# Patient Record
Sex: FEMALE | Race: WHITE | URBAN - METROPOLITAN AREA
[De-identification: names, ages, dates, MRNs, and addresses within clinical notes are randomized per-mention and may not be internally consistent; named-entity substitution may affect disease eponyms.]

---

## 2020-07-15 RX ORDER — CLONAZEPAM 0.5 MG/1
TABLET, ORALLY DISINTEGRATING ORAL
COMMUNITY
End: 2020-08-31

## 2020-07-15 RX ORDER — OMEPRAZOLE 20 MG/1
20 CAPSULE, DELAYED RELEASE ORAL DAILY
COMMUNITY
End: 2020-08-31

## 2020-07-15 RX ORDER — ACYCLOVIR 400 MG/1
400 TABLET ORAL AS NEEDED
COMMUNITY

## 2020-08-05 LAB
ALBUMIN SERPL-MCNC: 4.6 G/DL (ref 3.9–5)
ALBUMIN/GLOB SERPL: 2.1 {RATIO} (ref 1.2–2.2)
ALP SERPL-CCNC: 51 IU/L (ref 39–117)
ALT SERPL-CCNC: 17 IU/L (ref 0–32)
AST SERPL-CCNC: 16 IU/L (ref 0–40)
BASOPHILS # BLD AUTO: 0 X10E3/UL (ref 0–0.2)
BASOPHILS NFR BLD AUTO: 1 %
BILIRUB SERPL-MCNC: 0.3 MG/DL (ref 0–1.2)
BUN SERPL-MCNC: 13 MG/DL (ref 6–20)
BUN/CREAT SERPL: 17 (ref 9–23)
CALCIUM SERPL-MCNC: 9.6 MG/DL (ref 8.7–10.2)
CHLORIDE SERPL-SCNC: 100 MMOL/L (ref 96–106)
CHOLEST SERPL-MCNC: 144 MG/DL (ref 100–199)
CO2 SERPL-SCNC: 22 MMOL/L (ref 20–29)
CREAT SERPL-MCNC: 0.76 MG/DL (ref 0.57–1)
EOSINOPHIL # BLD AUTO: 0.3 X10E3/UL (ref 0–0.4)
EOSINOPHIL NFR BLD AUTO: 4 %
ERYTHROCYTE [DISTWIDTH] IN BLOOD BY AUTOMATED COUNT: 12.3 % (ref 11.7–15.4)
GLOBULIN SER CALC-MCNC: 2.2 G/DL (ref 1.5–4.5)
GLUCOSE SERPL-MCNC: 97 MG/DL (ref 65–99)
HAV IGM SERPL QL IA: NEGATIVE
HBV CORE IGM SERPL QL IA: NEGATIVE
HBV SURFACE AG SERPL QL IA: NEGATIVE
HCT VFR BLD AUTO: 38.7 % (ref 34–46.6)
HCV AB S/CO SERPL IA: <0.1 S/CO RATIO (ref 0–0.9)
HDLC SERPL-MCNC: 60 MG/DL
HGB BLD-MCNC: 12.8 G/DL (ref 11.1–15.9)
IMM GRANULOCYTES # BLD AUTO: 0 X10E3/UL (ref 0–0.1)
IMM GRANULOCYTES NFR BLD AUTO: 0 %
LDLC SERPL CALC-MCNC: 69 MG/DL (ref 0–99)
LDLC/HDLC SERPL: 1.2 RATIO (ref 0–3.2)
LYMPHOCYTES # BLD AUTO: 2.1 X10E3/UL (ref 0.7–3.1)
LYMPHOCYTES NFR BLD AUTO: 36 %
MCH RBC QN AUTO: 30.9 PG (ref 26.6–33)
MCHC RBC AUTO-ENTMCNC: 33.1 G/DL (ref 31.5–35.7)
MCV RBC AUTO: 94 FL (ref 79–97)
MONOCYTES # BLD AUTO: 0.5 X10E3/UL (ref 0.1–0.9)
MONOCYTES NFR BLD AUTO: 8 %
NEUTROPHILS # BLD AUTO: 3 X10E3/UL (ref 1.4–7)
NEUTROPHILS NFR BLD AUTO: 51 %
PLATELET # BLD AUTO: 291 X10E3/UL (ref 150–450)
POTASSIUM SERPL-SCNC: 4.1 MMOL/L (ref 3.5–5.2)
PROT SERPL-MCNC: 6.8 G/DL (ref 6–8.5)
RBC # BLD AUTO: 4.14 X10E6/UL (ref 3.77–5.28)
SODIUM SERPL-SCNC: 138 MMOL/L (ref 134–144)
T4 SERPL-MCNC: NORMAL UG/DL
TRIGL SERPL-MCNC: 77 MG/DL (ref 0–149)
TSH SERPL DL<=0.005 MIU/L-ACNC: 4.56 UIU/ML (ref 0.45–4.5)
VLDLC SERPL CALC-MCNC: 15 MG/DL (ref 5–40)
WBC # BLD AUTO: 5.9 X10E3/UL (ref 3.4–10.8)

## 2020-08-06 ENCOUNTER — TELEPHONE (OUTPATIENT)
Dept: PRIMARY CARE CLINIC | Age: 30
End: 2020-08-06

## 2020-08-06 NOTE — TELEPHONE ENCOUNTER
Results scanned into system from [employer].  Kana Fairbanks states that the t4 wasn't able to collect because it wasn't enough specimen, so a new order would have to be done

## 2020-08-10 NOTE — TELEPHONE ENCOUNTER
Patient is asking for labs results; according to cate she keeps calling and she is going to come up here today

## 2020-08-11 NOTE — TELEPHONE ENCOUNTER
Normal  blood count, normal liver and kidney function and normal blood sugar. Normal cholesterols. Thyroid test normal ( borderline normal)  Suggest this needs to be rechecked in 3 months .     Hepatitis a, b, c negative

## 2020-08-13 NOTE — TELEPHONE ENCOUNTER
Patient called stating she had a few more questions about her lab results and she wants a call back.

## 2020-08-31 ENCOUNTER — OFFICE VISIT (OUTPATIENT)
Dept: PRIMARY CARE CLINIC | Age: 30
End: 2020-08-31
Payer: COMMERCIAL

## 2020-08-31 VITALS
WEIGHT: 155 LBS | RESPIRATION RATE: 18 BRPM | HEART RATE: 90 BPM | DIASTOLIC BLOOD PRESSURE: 68 MMHG | BODY MASS INDEX: 24.91 KG/M2 | HEIGHT: 66 IN | TEMPERATURE: 97.7 F | OXYGEN SATURATION: 99 % | SYSTOLIC BLOOD PRESSURE: 112 MMHG

## 2020-08-31 DIAGNOSIS — R94.6 ABNORMAL THYROID SCAN: Primary | Chronic | ICD-10-CM

## 2020-08-31 PROCEDURE — 99213 OFFICE O/P EST LOW 20 MIN: CPT | Performed by: FAMILY MEDICINE

## 2020-08-31 RX ORDER — SUMATRIPTAN 50 MG/1
50 TABLET, FILM COATED ORAL AS NEEDED
COMMUNITY
Start: 2020-07-16 | End: 2020-10-21

## 2020-08-31 RX ORDER — ALPRAZOLAM 1 MG/1
1 TABLET ORAL 3 TIMES DAILY
COMMUNITY
Start: 2020-08-08

## 2020-08-31 NOTE — PROGRESS NOTES
HISTORY OF PRESENT ILLNESS     Chronic episodes of dizziness and has been seeing speciality. Dizziness improved since psyche maide changes. Cardiology   Did carotid dopplers from Dr Ozzy Bundy revelaed that her thyroid  Had multiple cysts. The tsh was off us at last test but mildly so. .    Was told to see us for addl work up. Jenifer Aaron is a 27 y.o. female. Past Medical History:   Diagnosis Date    Anxiety     Anxiety     Headache     around periods     Social History     Tobacco Use    Smoking status: Never Smoker    Smokeless tobacco: Never Used   Substance Use Topics    Alcohol use: Not Currently     Frequency: Never    Drug use: Not on file       Family History   Problem Relation Age of Onset    Other Mother         Hep C    Lung Cancer Father        Review of Systems   Constitutional: Negative for chills, fever, malaise/fatigue and weight loss. HENT: Negative for sore throat. Respiratory: Negative for cough. Cardiovascular: Negative for chest pain, palpitations, orthopnea and leg swelling. Gastrointestinal: Negative for constipation, diarrhea and nausea. Musculoskeletal: Negative for myalgias and neck pain. Skin: Negative for itching and rash. Neurological: Positive for dizziness and headaches. Negative for tremors, focal weakness and loss of consciousness. Endo/Heme/Allergies: Does not bruise/bleed easily. Physical Exam  Vitals signs and nursing note reviewed. Constitutional:       General: She is not in acute distress. Appearance: Normal appearance. She is not ill-appearing. HENT:      Head: Normocephalic and atraumatic. Eyes:      General: No scleral icterus. Extraocular Movements: Extraocular movements intact. Conjunctiva/sclera: Conjunctivae normal.   Neck:      Musculoskeletal: No erythema, neck rigidity or muscular tenderness. Thyroid: Thyromegaly present. No thyroid mass or thyroid tenderness.       Trachea: Trachea and phonation normal.   Pulmonary:      Effort: Pulmonary effort is normal. No respiratory distress. Breath sounds: Normal breath sounds. No wheezing or rales. Abdominal:      Palpations: Abdomen is soft. Musculoskeletal:      Right lower leg: No edema. Left lower leg: No edema. Lymphadenopathy:      Cervical: No cervical adenopathy. Right cervical: No superficial, deep or posterior cervical adenopathy. Left cervical: No superficial, deep or posterior cervical adenopathy. Skin:     Capillary Refill: Capillary refill takes less than 2 seconds. Neurological:      General: No focal deficit present. Mental Status: She is alert and oriented to person, place, and time. Cranial Nerves: No cranial nerve deficit. Deep Tendon Reflexes: Reflexes normal.   Psychiatric:         Mood and Affect: Mood normal.         Behavior: Behavior normal.         Thought Content: Thought content normal.         Judgment: Judgment normal.           ASSESSMENT and PLAN  Diagnoses and all orders for this visit:    1. Abnormal thyroid scan  Comments:  get labs and  set up own appt to see endocrinology .     Orders:  -     T4 (THYROXINE)  -     THYROID PEROXIDASE (TPO) AB  -     T3 TOTAL  -     TSH 3RD GENERATION         Orders Placed This Encounter    T4 (THYROXINE)    THYROID PEROXIDASE (TPO) AB    T3 TOTAL    TSH 3RD GENERATION    ALPRAZolam (XANAX) 1 mg tablet    DISCONTD: SUMAtriptan (IMITREX) 50 mg tablet

## 2020-09-01 LAB
T3 SERPL-MCNC: 79 NG/DL (ref 71–180)
T4 SERPL-MCNC: 6.2 UG/DL (ref 4.5–12)
THYROPEROXIDASE AB SERPL-ACNC: 260 IU/ML (ref 0–34)
TSH SERPL DL<=0.005 MIU/L-ACNC: 2.62 UIU/ML (ref 0.45–4.5)

## 2020-09-08 ENCOUNTER — TELEPHONE (OUTPATIENT)
Dept: ENDOCRINOLOGY | Age: 30
End: 2020-09-08

## 2020-09-08 NOTE — TELEPHONE ENCOUNTER
----- Message from Roopa Grace sent at 9/2/2020  3:34 PM EDT -----  Regarding: Dr. Dona Nguyen General Message/Vendor Calls    Caller's first and last name:      Reason for call: Regarding scheduling NP thyroid referred by Dr. Na Viveros [phone]. Call back required yes/no and why: Yes       Best contact number(s): [phone]      Details to clarify the request: No apts available.       Roopa Grace

## 2020-09-23 ENCOUNTER — TELEPHONE (OUTPATIENT)
Dept: PRIMARY CARE CLINIC | Age: 30
End: 2020-09-23

## 2020-10-08 ENCOUNTER — TELEPHONE (OUTPATIENT)
Dept: PRIMARY CARE CLINIC | Age: 30
End: 2020-10-08

## 2020-10-14 NOTE — PROGRESS NOTES
Normal thyroid panel with elevated thyroid peroxidase antibodies. May have Hashimoto's thyroiditis. Shireen warner next week.    See pt case

## 2020-10-14 NOTE — TELEPHONE ENCOUNTER
Normal thyroid panel with elevated thyroid peroxidase antibodies. May have Hashimoto's thyroiditis. Jeanella Crea ee Dr Elmo Osler endo next week.

## 2020-10-21 ENCOUNTER — OFFICE VISIT (OUTPATIENT)
Dept: ENDOCRINOLOGY | Age: 30
End: 2020-10-21
Payer: COMMERCIAL

## 2020-10-21 VITALS
HEART RATE: 82 BPM | WEIGHT: 157 LBS | RESPIRATION RATE: 14 BRPM | HEIGHT: 66 IN | BODY MASS INDEX: 25.23 KG/M2 | OXYGEN SATURATION: 98 % | TEMPERATURE: 97.6 F | SYSTOLIC BLOOD PRESSURE: 123 MMHG | DIASTOLIC BLOOD PRESSURE: 81 MMHG

## 2020-10-21 DIAGNOSIS — E04.1 THYROID CYST: Primary | ICD-10-CM

## 2020-10-21 DIAGNOSIS — E06.3 HASHIMOTO'S THYROIDITIS: ICD-10-CM

## 2020-10-21 PROCEDURE — 76536 US EXAM OF HEAD AND NECK: CPT | Performed by: INTERNAL MEDICINE

## 2020-10-21 PROCEDURE — 99244 OFF/OP CNSLTJ NEW/EST MOD 40: CPT | Performed by: INTERNAL MEDICINE

## 2020-10-21 RX ORDER — BISMUTH SUBSALICYLATE 262 MG
1 TABLET,CHEWABLE ORAL DAILY
COMMUNITY

## 2020-10-21 NOTE — PROGRESS NOTES
Jero Dominguez MD          Patient Information  Date:10/21/2020  Name : Bo Link 27 y.o.     YOB: 1990         Referred by: Colten Brooks MD         Chief Complaint   Patient presents with    New Patient     referred for Thyroid       History of present illness    Bo Link is a 27 y.o. female  here for evaluation of thyroid cyst.  She was clinically found to have cardiac murmur, evaluated by Dr. Jasmyne Fuchs and had carotid ultrasound which showed thyroid cysts. Thyroid function tests: TSH was minimally off in July 2020, repeat TSH normal, TPO elevated. Mother might have thyroid issues. No family history of other autoimmune conditions    No dysphagia,dysphonia or dyspnea. Denies nervousness,shakiness,palpitations  No constipation or cold intolerance/heat intolerance  No history of known radiation exposure    No FH of thyroid disease. No FH of thyroid cancer     Past Medical History:   Diagnosis Date    Anxiety     Anxiety     Headache     around periods       Current Outpatient Medications   Medication Sig    SUMAtriptan (IMITREX) 50 mg tablet TAKE 1 TABLET BY MOUTH AT ONSET OF MIGRAINE, MAY REPEAT X 1 IN 2 HRS IF NEEDED. MAXIUMUM  MG IN 24 HRS    multivitamin (ONE A DAY) tablet Take 1 Tab by mouth daily.  vitamin C/biotin (HAIR-SKIN-NAILS, VIT C-BIOTIN, PO) Take  by mouth.  ALPRAZolam (XANAX) 1 mg tablet Take 1 mg by mouth three (3) times daily.  acyclovir (ZOVIRAX) 400 mg tablet Take 400 mg by mouth as needed. No current facility-administered medications for this visit.           Review of Systems:  - Constitutional Symptoms: no fevers, chills, weight loss  - Eyes: no blurry vision or double vision  - Cardiovascular: no chest pain or palpitations  - Respiratory: no cough or shortness of breath  - Gastrointestinal: no  abdominal pain  - Musculoskeletal: no joint pains or weakness  - Integumentary: no rashes  - Neurological: no numbness, tingling, or headaches  - Psychiatric: no depression or anxiety  - Endocrine: no heat or cold intolerance, no polyuria or polydipsia    Physical Examination:  Blood pressure 123/81, pulse 82, temperature 97.6 °F (36.4 °C), temperature source Oral, resp. rate 14, height 5' 6\" (1.676 m), weight 157 lb (71.2 kg), SpO2 98 %. - Body mass index is 25.34 kg/m². - General: pleasant, no distress, good eye contact  - HEENT: no exopthalmos, no periorbital edema, no scleral/conjunctival injection, EOMI, no lid lag or stare  - Neck: supple, no thyromegaly, nodules, lymph nodes,   - Cardiovascular: regular,  normal S1 and S2, no murmurs  - Respiratory: clear to auscultation bilaterally  - Gastrointestinal: soft, nontender, nondistended, BS +  - Musculoskeletal: no proximal muscle weakness in upper or lower extremities  - Integumentary: no tremors, no edema  - Neurological: alert and oriented   - Psychiatric: normal mood and affect  - Skin - normal turgor    Data Reviewed:       Lab Results   Component Value Date/Time    TSH 2.620 08/31/2020 11:24 AM    T4, Total 6.2 08/31/2020 11:24 AM        [] Reviewed labs    Assessment/Plan:     Hashimoto's thyroiditis  Thyroid cyst    Hashimoto's thyroiditis: Annual TSH  No need to follow antibodies  Discussed the association with other autoimmune conditions  Autoantibodies reflect autoimmunity and higher risk of hypothyroidism than general population, as long as TSH is normal there is no need for thyroid hormone replacement. Lifestyle changes discussed which she is already following      Thyroid ultrasound in the office revealed diffusely heterogenous gland with hypoechoic micronodules and surroundning echogenic rim  suggestive of autoimmune /Hashimoto's thyroiditis. Thank you for allowing me to participate in the care of this patient.     Lisa Abreu MD

## 2020-10-21 NOTE — PROGRESS NOTES
Lynsey Woods is a 27 y.o. female here for   Chief Complaint   Patient presents with    New Patient     referred for Thyroid       1. Have you been to the ER, urgent care clinic since your last visit? Hospitalized since your last visit? -n/a    2. Have you seen or consulted any other health care providers outside of the [address] [place] [address] since your last visit?   Include any pap smears or colon screening.-n/a

## 2020-10-21 NOTE — LETTER
10/25/20 Patient: Esperanza Hanley YOB: 1990 Date of Visit: 10/21/2020 Collette Plant, MD 
[facility] [id number] [place] [address] VIA In Basket Dear Collette Plant, MD, Thank you for referring Ms. Nette Benson to [address] [address] for evaluation. My notes for this consultation are attached. If you have questions, please do not hesitate to call me. I look forward to following your patient along with you. Sincerely, Damon Pak MD

## 2020-10-26 PROBLEM — R94.6 ABNORMAL THYROID SCAN: Status: ACTIVE | Noted: 2020-10-26

## 2020-11-04 ENCOUNTER — TELEPHONE (OUTPATIENT)
Dept: PRIMARY CARE CLINIC | Age: 30
End: 2020-11-04

## 2020-11-04 DIAGNOSIS — G43.009 MIGRAINE WITHOUT AURA, NOT INTRACTABLE, WITHOUT STATUS MIGRAINOSUS: ICD-10-CM

## 2020-11-04 RX ORDER — SUMATRIPTAN 50 MG/1
TABLET, FILM COATED ORAL
Qty: 9 TAB | Refills: 1 | Status: SHIPPED | OUTPATIENT
Start: 2020-11-04

## 2020-11-04 NOTE — TELEPHONE ENCOUNTER
Sent, but if she is using this medication every day she needs an appointment.  Don't think this medication is meant to be used every day

## 2021-09-09 RX ORDER — ACYCLOVIR 400 MG/1
400 TABLET ORAL AS NEEDED
OUTPATIENT
Start: 2021-09-09

## 2021-09-09 NOTE — TELEPHONE ENCOUNTER
This came in via paper; last prescribe by Dr. Gino Omer. Appear she has not seen you before. Does she need an appt?

## 2022-03-19 PROBLEM — R94.6 ABNORMAL THYROID SCAN: Status: ACTIVE | Noted: 2020-10-26

## 2023-05-22 RX ORDER — ACYCLOVIR 400 MG/1
400 TABLET ORAL PRN
COMMUNITY

## 2023-05-22 RX ORDER — ALPRAZOLAM 1 MG/1
1 TABLET ORAL 3 TIMES DAILY
COMMUNITY
Start: 2020-08-08

## 2023-05-22 RX ORDER — SUMATRIPTAN 50 MG/1
TABLET, FILM COATED ORAL
COMMUNITY
Start: 2020-11-04

## 2023-10-05 NOTE — PROGRESS NOTES
Thyroid Ultrasound Report    Patient Information  Date:10/25/2020  Name : Melyssa Pena 27 y.o.     YOB: 1990         Referred by: Jm Coffman MD , MD    Indication: Thyroid nodule    Multiple real time longitudinal and transverse images were obtained using a high  resolution ultrasound with a Linear transducer. Thyroid gland is diffusely heterogenous, right thyroid lobe measures 2.8 x 1.5 x 1.0 cm. Left lobe measures 3 x 1.5 x 1.2 cm. Isthmus measures 0.19 cm. There are multiple small hypoechoic micronodules consistent with Hashimoto's thyroiditis. No dominant cyst    Impression:  The thyroid gland is diffusely heterogenous in texture with hypoechoic micronodules and surroundning echogenic rim  suggestive of autoimmune /Hashimoto's thyroiditis.        Gómez Benson MD · Offered nicotine replacement therapy

## 2024-06-25 ENCOUNTER — HOSPITAL ENCOUNTER (OUTPATIENT)
Facility: HOSPITAL | Age: 34
Discharge: HOME | End: 2024-06-28
Payer: COMMERCIAL

## 2024-06-25 VITALS
OXYGEN SATURATION: 100 % | TEMPERATURE: 98.6 F | HEART RATE: 97 BPM | SYSTOLIC BLOOD PRESSURE: 114 MMHG | DIASTOLIC BLOOD PRESSURE: 79 MMHG

## 2024-06-25 PROCEDURE — 90853 GROUP PSYCHOTHERAPY: CPT

## 2024-06-25 ASSESSMENT — PATIENT HEALTH QUESTIONNAIRE - PHQ9
6. FEELING BAD ABOUT YOURSELF - OR THAT YOU ARE A FAILURE OR HAVE LET YOURSELF OR YOUR FAMILY DOWN: SEVERAL DAYS
5. POOR APPETITE OR OVEREATING: SEVERAL DAYS
9. THOUGHTS THAT YOU WOULD BE BETTER OFF DEAD, OR OF HURTING YOURSELF: NOT AT ALL
SUM OF ALL RESPONSES TO PHQ QUESTIONS 1-9: 10
7. TROUBLE CONCENTRATING ON THINGS, SUCH AS READING THE NEWSPAPER OR WATCHING TELEVISION: SEVERAL DAYS
4. FEELING TIRED OR HAVING LITTLE ENERGY: SEVERAL DAYS
SUM OF ALL RESPONSES TO PHQ QUESTIONS 1-9: 10
SUM OF ALL RESPONSES TO PHQ QUESTIONS 1-9: 10
8. MOVING OR SPEAKING SO SLOWLY THAT OTHER PEOPLE COULD HAVE NOTICED. OR THE OPPOSITE, BEING SO FIGETY OR RESTLESS THAT YOU HAVE BEEN MOVING AROUND A LOT MORE THAN USUAL: SEVERAL DAYS
SUM OF ALL RESPONSES TO PHQ9 QUESTIONS 1 & 2: 3
2. FEELING DOWN, DEPRESSED OR HOPELESS: SEVERAL DAYS
3. TROUBLE FALLING OR STAYING ASLEEP: MORE THAN HALF THE DAYS
SUM OF ALL RESPONSES TO PHQ QUESTIONS 1-9: 10
1. LITTLE INTEREST OR PLEASURE IN DOING THINGS: MORE THAN HALF THE DAYS

## 2024-06-25 ASSESSMENT — ANXIETY QUESTIONNAIRES
2. NOT BEING ABLE TO STOP OR CONTROL WORRYING: NEARLY EVERY DAY
3. WORRYING TOO MUCH ABOUT DIFFERENT THINGS: NOT AT ALL
1. FEELING NERVOUS, ANXIOUS, OR ON EDGE: NEARLY EVERY DAY
7. FEELING AFRAID AS IF SOMETHING AWFUL MIGHT HAPPEN: NEARLY EVERY DAY
GAD7 TOTAL SCORE: 15
6. BECOMING EASILY ANNOYED OR IRRITABLE: SEVERAL DAYS
5. BEING SO RESTLESS THAT IT IS HARD TO SIT STILL: MORE THAN HALF THE DAYS
IF YOU CHECKED OFF ANY PROBLEMS ON THIS QUESTIONNAIRE, HOW DIFFICULT HAVE THESE PROBLEMS MADE IT FOR YOU TO DO YOUR WORK, TAKE CARE OF THINGS AT HOME, OR GET ALONG WITH OTHER PEOPLE: VERY DIFFICULT
4. TROUBLE RELAXING: NEARLY EVERY DAY

## 2024-06-25 NOTE — BH NOTE
Goal: Intake Assessment for PHP      met with the patient for an intake assessment. PT denied SI/HI upon assessment. Pt did sign an GEORGI for her therapist and mother. SW did not complete COWS, Audit-C, or CIWA due to Pt denying current alcohol and opiate use.         
SSDI [] SSI  [] GADIS/ADC [] SS    [] Unemployment [] Handles own money [] Has payee (who)     service: [x] No [] Yes    Legal History: Current Charges: [x] No [] Yes   [] Police hold: [x] No [] Yes    On probation: [x] No [] Yes     [] long-term time: [x] No [] Yes (where/when)    TRAUMA AND ABUSE HISTORY  [] No [x] Yes  [] Emotional/mental [] Sexual []Physical  [] Childhood history   Describe (who, what, when):     Pt lost her father at age 11; pt reported father  of lung cancer, pt expressed that father passed with hospice - pt expressed that father passed 2 days after labor day (pt expressed that she did not realize the emotional impact as a child but feel that it has become more prevalent as she became an adult); pt expressed that she was raised by a single mother     Pt expressed that as a child she didn't think about it as much but as she became grown - asking for men or father figure for help can be difficult; Pt expressed she she began to mourn the relationship she would not have with her father during her 20's - 30's     Have you ever experienced being in a war zone or  combat: [x] No [] Yes  Have you ever experienced a bad accident, serious illness or natural disaster and thought you might be killed: [x] No [] Yes  Have you ever been attacked (or witnessed an attack) with intent to kill or injure:   [x] No [] Yes   Describe: N/A    Is there anything which triggers your re-experiencing this event: [x] No [] Yes   Describe: N/A    STRESSORS    Any recent losses: [] No [x] Yes     Pt expressed that she has been helping her mother manage feelings of uncomfortableness; pt expressed that she feels as though it had made her anxiety \"worse\" over time; worries about future and what they would look like as far as housing and future;     Pt expressed that she feels scared but optimisitc when thinking about future - pt expressed that she feels scared due to \"not knowing what I wanna be\", her career

## 2024-06-25 NOTE — GROUP NOTE
Group Therapy Note    Date: 6/25/2024    Group Start Time:  1:10 PM  Group End Time:  2:00 PM  Group Topic: Psychoeducation    Jorge Gamboa MSW        Group Therapy Note    Writer facilitated group and started with requesting patients to identify behaviors, emotions, thoughts, and physical signs related to anxiety. Patient's completed identification worksheet and were encouraged to reflect on why being self-aware of signs is the first step towards learning how to manage them. Patients were provided with a list of cognitive distortion to review and encouraged to discuss which ones impact their anxiety the most. To conclude, writer asked patient to write an anxiety provoked thought on post it note and put it in a jar. Writer opened each note and requested patients to identify distortion along with helping to reframe it.       Attendees: 5      Patient's Goal: to identify cognitive distortions, to manage and demonstrate anxiety management skills       Notes:  Pt arrived the last twenty minutes of group and followed along with the end of discussion. Pt shared struggling with catastrophizing the most with her thoughts.    Status After Intervention:  Unchanged    Participation Level: Active Listener    Participation Quality: Appropriate      Speech:  normal      Thought Process/Content: Logical      Affective Functioning: Congruent      Mood: anxious      Level of consciousness:  Alert and Oriented x4      Response to Learning: Progressing to goal      Endings: None Reported    Modes of Intervention: Education and Support      Discipline Responsible: /Counselor      Signature:  DANIEL Mccarty    
                                                                      Group Therapy Note    Date: 6/25/2024    Group Start Time:  1:10 PM  Group End Time:  2:00 PM  Group Topic: Psychoeducation    [facility] Kait Love MSW        Group Therapy Note  This writer facilitated a psychoeducational group focusing on patients to identify their signs of anxiety. Each patient was encouraged to identify triggers, risk factors, thoughts, behaviors, and emotions they experienced during the anxiety cycle. After this, this writer facilitated a discussion regarding the activity for thoughts, feelings, and reflections following the activity.       Attendees: 5       Patient's Goal:   To discuss anxiety and the anxiety cycle    Notes: PT was presented and participated in the group. Pt volunteered to provide insight on their answers and reflect on those answers. Pt identified the triggers and thoughts she experiences during intense anxiety. PT identified her trigger as health concerns. Pt identified the thoughts she experienced as, \"Why is this happening to me?\". Pt provided active listening and support to their peers as they shared their reflections based on their responses.     Status After Intervention:  Improved    Participation Level: Active Listener and Interactive    Participation Quality: Appropriate, Sharing, and Supportive      Speech:  normal      Thought Process/Content: Logical      Affective Functioning: Congruent      Mood: calm      Level of consciousness:  Alert and Oriented x4      Response to Learning: Able to verbalize current knowledge/experience, Able to verbalize/acknowledge new learning, Able to retain information, and Capable of insight      Endings: None Reported    Modes of Intervention: Education, Support, and Socialization      Discipline Responsible: /Counselor      Signature:  DANIEL Miller    
                                                                      Group Therapy Note    Date: 6/25/2024    Group Start Time:  2:00 PM  Group End Time:  2:45 PM  Group Topic: Wrap-Up    [facility]    Kait Crenshaw MSW        Group Therapy Note  This writer facilitated a wrap-up group by encouraging the patients to identify a song that brings out intense emotions, whether happy or sad. Each patient was asked to complete an outpatient behavioral health wrap-up sheet. After allowing time for this, this facilitator encouraged patients to discuss their thoughts, feelings, and reflections following the activity.      Attendees: 5       Patient's Goal: To express emotions    Notes: PT was present and participated in the PHP group. Pt volunteered to share their response and listened to the support given to them by their peers. 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 PT provided active listening for peers as they shared their responses to the question.     Status After Intervention:  Improved    Participation Level: Active Listener and Interactive    Participation Quality: Appropriate, Sharing, and Supportive      Speech:  normal      Thought Process/Content: Logical      Affective Functioning: Congruent      Mood: calm      Level of consciousness:  Alert and Oriented x4      Response to Learning: Able to verbalize current knowledge/experience, Able to verbalize/acknowledge new learning, Able to retain information, and Capable of insight      Endings: None Reported    Modes of Intervention: Support, Socialization, and Exploration      Discipline Responsible: /Counselor      Signature:  DANIEL Miller    
MSW

## 2024-06-25 NOTE — SUICIDE SAFETY PLAN
SAFETY PLAN    A suicide Safety Plan is a document that supports someone when they are having thoughts of suicide.    Warning Signs that indicate a suicidal crisis may be developing: What (situations, thoughts, feelings, body sensations, behaviors, etc.) do you experience that lets you know you are beginning to think about suicide?  1. Informed staff that she is having thoughts  2. N/A  3. N/A    Internal Coping Strategies:  What things can I do (relaxation techniques, hobbies, physical activities, etc.) to take my mind off my problems without contacting another person?  1. Walking  2. Listening to Music  3. Watch Television  4. Reading    People and social settings that provide distraction: Who can I call or where can I go to distract me?  1. Name: Miguel VYAS  Phone: [phone]  2. Name: Vandana ASHTON   Phone: [phone]   3. Place: [address]            4. Place: Walk in [facility]    People whom I can ask for help: Who can I call when I need help - for example, friends, family, clergy, someone else?  Name: Miguel VYAS  Phone: [phone]  2. Name: Vandana ASHTON   Phone: [phone]     Professionals or Behavioral Health agencies I can contact during a crisis: Who can I call for help - for example, my doctor, my psychiatrist, my psychologist, a mental health provider, a suicide hotline?  1. Clinician Name: Kait ([facility])   Phone: [phone]      Clinician Pager or Emergency Contact #: [phone]    2. Clinician Name: [facility] (Crisis Line)   Phone: [phone]      Clinician Pager or Emergency Contact #: [phone]    3. Suicide Prevention Lifeline: 3-527-904-TALK (4876)    4. Local Behavioral Health Emergency Services -  for example, [facility], [facility] suicide hotline, [facility] Hotline:   [address]      Emergency Services Address: [address] [place] [address] [address] [address]       Emergency Services Phone:  [phone]     Making the environment safe: How can I make my environment

## 2024-06-26 ENCOUNTER — HOSPITAL ENCOUNTER (OUTPATIENT)
Facility: HOSPITAL | Age: 34
Discharge: HOME | End: 2024-06-29
Payer: COMMERCIAL

## 2024-06-26 VITALS — DIASTOLIC BLOOD PRESSURE: 72 MMHG | OXYGEN SATURATION: 100 % | HEART RATE: 95 BPM | SYSTOLIC BLOOD PRESSURE: 102 MMHG

## 2024-06-26 PROCEDURE — 90853 GROUP PSYCHOTHERAPY: CPT

## 2024-06-26 NOTE — GROUP NOTE
Group Therapy Note    Date: 6/26/2024    Group Start Time: 10:40 AM  Group End Time: 11:30 AM  Group Topic: Cognitive Skills    [facility] Jorge Lr MSW        Group Therapy Note    Writer facilitated group on the cognitive model. Patients were provided with a worksheet reviewing the model and the different elements. Patients were encouraged to read and reflect on three practice exercises and then come up with one situation of their own. Patients were asked to write a situation, negative thought, and emotion/behavior on the board one at a time. Peers were encouraged to support pt in identifying rational thoughts and subsequent emotions/behaviors. Writer was unable to finish activity and stated it would be continued in the next group facilitated by writer.       Attendees: 6      Patient's Goal: to learn about CBT model, to identify and reframe irrational thoughts       Notes:  Pt processed and shared her example with peers. Patient expressed fearing to drive again after her first panic attack in 13 years this past month. Pt expressed fearing having the panic attack more so than driving. Patient was receptive to support and feedback offered by peers.     Status After Intervention:  Improved    Participation Level: Active Listener    Participation Quality: Appropriate      Speech:  normal      Thought Process/Content: Logical      Affective Functioning: Congruent      Mood: anxious      Level of consciousness:  Alert and Oriented x4      Response to Learning: Able to verbalize/acknowledge new learning, Able to retain information, Capable of insight, and Progressing to goal      Endings: None Reported    Modes of Intervention: Education and Support      Discipline Responsible: /Counselor      Signature:  DANIEL Mccarty

## 2024-06-26 NOTE — GROUP NOTE
Group Therapy Note    Date: 6/26/2024    Group Start Time: 12:00 PM  Group End Time:  1:00 PM  Group Topic: Psychotherapy    [facility]    Kait Crenshaw MSW        Group Therapy Note  This writer facilitated a psychotherapy group providing education on challenging intrusive thoughts. This facilitator used two visual aids via [employer] to provide a definition and potential strategies for managing intrusive thoughts. This facilitator provided each patient with an interactive handout worksheet that included a thought diary. Each patient was encouraged to fill out the thought diary in groups or wait for real-time usage. After allowing time for this, this facilitator encouraged patients to discuss their thoughts, feelings, and reflections following the activity    Attendees: 6       Patient's Goal: To challenge intrusive thoughts    Notes: PT was present and participated in the PHP group. Pt volunteered to share their response and listened to the support given to them by their peers. Pt identified one strategy from the worksheet that she would find helpful to manage intrusive thoughts. Pt identified this strategy as finding an activity she enjoys to distract her mind, such as walking or listening to music. PT provided active listening for peers as they shared their responses to the question.     Status After Intervention:  Improved    Participation Level: Active Listener and Interactive    Participation Quality: Appropriate, Sharing, and Supportive      Speech:  normal      Thought Process/Content: Logical      Affective Functioning: Congruent      Mood: calm      Level of consciousness:  Alert and Oriented x4      Response to Learning: Able to verbalize current knowledge/experience, Able to verbalize/acknowledge new learning, Able to retain information, Capable of insight, and Progressing to goal      Endings: None Reported    Modes of Intervention:

## 2024-06-26 NOTE — GROUP NOTE
Group Therapy Note    Date: 6/26/2024    Group Start Time:  9:00 AM  Group End Time:  9:40 AM  Group Topic: Community Meeting    [facility]    Kait Crenshaw MSW        Group Therapy Note  This writer facilitated a community group using the [employer] to encourage compassion, shift perspectives, and build connections. This facilitator prompted each person to answer their specific question aloud and encouraged their peers to respond to the questions as well. This facilitator provided each patient with an outpatient check-in sheet and a lunch menu to complete. After allowing time for this, the facilitator encouraged patients to discuss their thoughts, feelings, and reflections following the activity.      Attendees: 5       Patient's Goal: To encourage compassion, shift perspectives, and build connections     Notes:  PT was present and participated in the PHP group. Pt's question was, \"What thoughts are you having right now?\". Pt volunteered to share their response and listened to the support given to them by their peers. PT expressed that she is thinking about how her brother may be handling the doctor's appointments with her mother. PT provided active listening for peers as they shared their responses to the question.     Status After Intervention:  Improved    Participation Level: Active Listener and Interactive    Participation Quality: Appropriate, Sharing, and Supportive      Speech:  normal      Thought Process/Content: Logical      Affective Functioning: Congruent      Mood: calm      Level of consciousness:  Alert and Oriented x4      Response to Learning: Able to verbalize current knowledge/experience, Able to verbalize/acknowledge new learning, Able to retain information, and Capable of insight      Endings: None Reported    Modes of Intervention: Support, Socialization, and Exploration      Discipline Responsible: Social

## 2024-06-26 NOTE — BH NOTE
Patient Update    This writer met with the PT to review and collaborate to provide updates to her treatment plan goals. This writer a check in session to inquire about PT progress in groups and provide updates, if needed. The pt was receptive of the goals that were created and reviewed each goal along with this writer.

## 2024-06-26 NOTE — BH NOTE
PATIENT UPDATE    This writer reached out to the pt to inform her of her insurance denial. This writer informed the pt that [facility] staff has begun to process an appeal and reached out to her psychiatrist for additional documentation to support the claim. Pt was receptive of this information and inquired about potential cost of program if insurance does not approve. This writer indicated there may be a potential cost, however we are willing to work with her and support her however we can. PT was receptive of this information. PT inquired about having her psychiatrist refill prescriptions while attending [facility] program. This writer informed the pt that is okay, as long as she is not meeting with him. Pt was receptive of this information. This writer informed the pt she will keep her update as updates come about. PT was receptive.

## 2024-06-26 NOTE — BH NOTE
COLLATERAL CALL    This writer reached out to Dr. Hernández ([facility]) to provide insight about the pt's insurance denial. This writer inquired if Dr. Hernández would be able to write a letter for the appeal process and to determine the purpose of this treatment. Dr. Hernández was receptive to this information and indicated that  he would work on sending it over as soon as possible. This writer provided Dr. Hernández with the fax number for PHP for him to send it over.

## 2024-06-26 NOTE — GROUP NOTE
Group Therapy Note    Date: 6/26/2024    Group Start Time:  1:10 PM  Group End Time:  2:00 PM  Group Topic: Relapse Prevention    [facility] Jorge Lr MSW        Group Therapy Note    Writer shadowed peer specialist, Madi facilitating group on relapse prevention. 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 Madi encouraged peers to ask questions and share personal experiences. To conclude, Madi encouraged peers to share one high and one low from the day.        Attendees: 5        Patient's Goal: to learn about relapse prevention       Notes:  Pt actively listened to specialist the first twenty minutes and then stepped out due to feeling overwhelmed by conversation around suicide. Patient was encouraged to use skills to regulate and return to group. Pt stayed out the rest of group and will continue to be encouraged towards goal.    Status After Intervention:  Unchanged    Participation Level: Minimal    Participation Quality: Appropriate      Speech:  normal      Thought Process/Content: Logical      Affective Functioning: Congruent      Mood: anxious      Level of consciousness:  Alert      Response to Learning: Progressing to goal      Endings: None Reported    Modes of Intervention: Support, Socialization, and Exploration      Discipline Responsible: /Counselor      Signature:  DANIEL Mccarty

## 2024-06-26 NOTE — GROUP NOTE
Group Therapy Note    Date: 6/26/2024    Group Start Time:  2:00 PM  Group End Time:  2:45 PM  Group Topic: Wrap-Up    [facility] Jorge Lr MSW        Group Therapy Note    Writer facilitated group and started with requesting patients to complete wrap up sheets along with lunch menus. Patients were asked to write a key takeaway on the board and share it with peers. Writer facilitated a tapping meditation and encouraged reflections on somatic skills. To conclude, patients were asked to identify current emotion on feelings wheel and draw it on the back of the sheet.    Attendees: 6      Patient's Goal: to identify takeaway, to assess safety, to identify emotions            Notes:  Pt denied SI/HI on wrap up sheet and identified takeaway was realizing she could step away when triggered and return more regulated. Pt shared knowing that in the past but forgetting about it. Patient shared tapping helped and completed feelings activity.    Status After Intervention:  Improved    Participation Level: Active Listener    Participation Quality: Appropriate      Speech:  normal      Thought Process/Content: Logical      Affective Functioning: Congruent      Mood: anxious      Level of consciousness:  Alert and Oriented x4      Response to Learning: Progressing to goal      Endings: None Reported    Modes of Intervention: Support, Socialization, and Exploration      Discipline Responsible: /Counselor      Signature:  DANIEL Mccarty

## 2024-06-26 NOTE — GROUP NOTE
Group Therapy Note    Date: 6/26/2024    Group Start Time:  9:40 AM  Group End Time: 10:30 AM  Group Topic: Process Group - Outpatient    [facility]    Kait Crenshaw MSW        Group Therapy Note  This writer facilitated a process group by encouraging the patients to share any stressors that have recently been impacting them. This writer encouraged the patients to support one another and offer feedback to each other when appropriate. After allowing time for this, this writer used open-ended mental health process questions to prompt insight and socialization amongst the patients.       Attendees: 5       Patient's Goal:  To identify current stressors and provide support     Notes: PT was present and participated in the PHP group. When asked if she had anything she would like to discuss during the process group, she stated that she did not. PT provided active listening for peers as they shared their responses to the question. PT provided various interventions and support to her peers.     Status After Intervention:  Improved    Participation Level: Active Listener and Interactive    Participation Quality: Appropriate, Sharing, and Supportive      Speech:  normal      Thought Process/Content: Logical      Affective Functioning: Congruent      Mood: calm      Level of consciousness:  Alert and Oriented x4      Response to Learning: Able to verbalize current knowledge/experience, Able to verbalize/acknowledge new learning, Able to retain information, and Capable of insight      Endings: None Reported    Modes of Intervention: Support, Socialization, and Exploration      Discipline Responsible: /Counselor      Signature:  DANIEL Miller

## 2024-06-27 ENCOUNTER — HOSPITAL ENCOUNTER (OUTPATIENT)
Facility: HOSPITAL | Age: 34
Discharge: HOME | End: 2024-06-30
Payer: COMMERCIAL

## 2024-06-27 VITALS
HEART RATE: 86 BPM | DIASTOLIC BLOOD PRESSURE: 69 MMHG | OXYGEN SATURATION: 100 % | TEMPERATURE: 98.5 F | SYSTOLIC BLOOD PRESSURE: 104 MMHG

## 2024-06-27 PROCEDURE — 90853 GROUP PSYCHOTHERAPY: CPT

## 2024-06-27 NOTE — GROUP NOTE
Group Therapy Note    Date: 6/27/2024    Group Start Time: 10:40 AM  Group End Time: 11:30 AM  Group Topic: Cognitive Skills    [facility]    Kait Crenshaw MSW        Group Therapy Note  This writer facilitated a cognitive skills group focusing on identifying a happy space. This writer began a group discussing happiness, what it means to them, and how someone can express that they are happy. This writer encourages the patients to identify spaces, places, objects, and things that make them happy. Each patient was encouraged to create a visual of their happy place with the things listed, etc. After allowing time for this, the facilitator encouraged patients to discuss their thoughts, feelings, and reflections following the activity.    Attendees: 6       Patient's Goal:  To identify their happy place    Notes: PT was presented and participated in the group. Pt volunteered to provide insight on their answers and reflect on those answers. PT identified that her happy place is her [employer] home. PT expressed that she identifies this as her safe place due to feeling supported by the people around her. Pt provided active listening and support to their peers as they shared their reflections based on their responses.     Status After Intervention:  Improved    Participation Level: Active Listener and Interactive    Participation Quality: Appropriate, Sharing, and Supportive      Speech:  normal      Thought Process/Content: Logical      Affective Functioning: Congruent      Mood: calm      Level of consciousness:  Alert and Oriented x4      Response to Learning: Able to verbalize current knowledge/experience, Able to verbalize/acknowledge new learning, Able to retain information, Capable of insight, and Progressing to goal      Endings: None Reported    Modes of Intervention: Education, Support, and Socialization      Discipline Responsible: Social

## 2024-06-27 NOTE — GROUP NOTE
Group Therapy Note    Date: 6/27/2024    Group Start Time:  2:00 PM  Group End Time:  2:45 PM  Group Topic: Wrap-Up    [facility]    Kait Crenshaw MSW        Group Therapy Note  This writer facilitated a wrap-up group by encouraging the patients to identify a main takeaway of the day and participate in stress bingo. Each patient was encouraged to go to the board and write down their main takeaway of the day. Each patient was provided with a bingo board and markers to play the stress bingo game. Each patient was asked to complete an outpatient behavioral health wrap-up sheet and lunch menu.    Attendees: 6       Patient's Goal:  To identify main takeaway and participate in activity    Notes: PT was present and participated in the PHP group. Pt volunteered to share their response and listened to the support given to them by their peers. Pt identified that her main takeaway was realizing that she is more capable of what she thinks. PT provided active listening for peers as they shared their responses to the question.     Status After Intervention:  Improved    Participation Level: Active Listener and Interactive    Participation Quality: Appropriate, Sharing, and Supportive      Speech:  normal      Thought Process/Content: Logical      Affective Functioning: Congruent      Mood: calm      Level of consciousness:  Alert and Oriented x4      Response to Learning: Able to verbalize current knowledge/experience, Able to verbalize/acknowledge new learning, Able to retain information, Capable of insight, and Progressing to goal      Endings: None Reported    Modes of Intervention: Support, Socialization, and Exploration      Discipline Responsible: /Counselor      Signature:  DANIEL Miller

## 2024-06-27 NOTE — GROUP NOTE
Group Therapy Note    Date: 6/27/2024    Group Start Time:  9:00 AM  Group End Time:  9:40 AM  Group Topic: Community Meeting    [facility] Jorge Lr MSW        Group Therapy Note    Writer facilitated group and started with requesting patients to complete check in sheets to assess safety. Patients were asked to identify an intention of the day and jot it on the board to share with peers. Patients were requested to pull out CBT worksheet from yesterday and share their situations with peers to process and seek advice. The discussion continued into the next group.        Attendees: 6      Patient's Goal: to assess safety, to identify goal, to process emotions      Notes:  Pt denied SI/MEHREEN/HI on check in sheet and identified intention of the day was to learn more ways to cope with anxiety. Patient actively listened to peer process and encouraged him to validate his emotions before changing behavior.    Status After Intervention:  Improved    Participation Level: Active Listener    Participation Quality: Appropriate      Speech:  normal      Thought Process/Content: Logical      Affective Functioning: Congruent      Mood: anxious      Level of consciousness:  Alert and Oriented x4      Response to Learning: Capable of insight and Progressing to goal      Endings: None Reported    Modes of Intervention: Support, Socialization, and Exploration      Discipline Responsible: /Counselor      Signature:  DANIEL Mccarty

## 2024-06-27 NOTE — BH NOTE
COLLATERAL CALL    This writer reached out to Vandana Geronimo ([facility]) via telephone to make contact and confirm it was her. This writer informed Vandana that if she ever has any questions, would like updates, or has concerns, please reach out. Vandana was receptive to the information and thanked the writer for the call.

## 2024-06-27 NOTE — GROUP NOTE
Group Therapy Note    Date: 6/27/2024    Group Start Time:  1:10 PM  Group End Time:  2:00 PM  Group Topic: Psychoeducation    [facility] Kait Love MSW        Group Therapy Note  This writer facilitated a psychoeducational group focusing on patients identifying and reflecting on their happy places. Each patient was given the chance to talk about their happy place and the items they put in it. After this, this writer facilitated a discussion regarding the activity for thoughts, feelings, and reflections following the activity.    Attendees: 6       Patient's Goal:  To reflect on their happy place    Notes: PT was presented and participated in the group. Pt volunteered to provide insight on their answers and reflect on those answers. Pt provided insight to her peers that her happy place is a space where all of her favorite things are provided. Pt included a symbol of her cyndy because it means a lot to her and is something she uses for support. Pt provided active listening and support to their peers as they shared their reflections based on their responses.    Status After Intervention:  Improved    Participation Level: Active Listener and Interactive    Participation Quality: Appropriate, Sharing, and Supportive      Speech:  normal      Thought Process/Content: Logical      Affective Functioning: Congruent      Mood: calm      Level of consciousness:  Alert and Oriented x4      Response to Learning: Able to verbalize current knowledge/experience, Able to verbalize/acknowledge new learning, Able to retain information, Capable of insight, and Progressing to goal      Endings: None Reported    Modes of Intervention: Education, Support, and Socialization      Discipline Responsible: /Counselor      Signature:  DANIEL Miller

## 2024-06-27 NOTE — GROUP NOTE
Group Therapy Note    Date: 6/27/2024    Group Start Time:  9:40 AM  Group End Time: 10:30 AM  Group Topic: Process Group - Outpatient    [facility]    Jorge Clifton MSW        Group Therapy Note    Writer facilitated group and continued processing from community meeting. Each patient was asked to write on the board a situation, negative thought, and emotion/behavior they identified on CBT worksheet yesterday. Patients processed situation and with feedback from peers were able to reframe irrational thoughts.         Attendees: 6      Patient's Goal: to process emotions, to reframe cognitive distortions       Notes:  Patient actively listened to peers and was supportive towards them. Pt related to peer struggling with feeling judged and gaslit by people in her life who do not understand how anxiety impacts her. Patient encouraged peer to reframe harsh sentences to more compassionate ones.    Status After Intervention:  Improved    Participation Level: Active Listener    Participation Quality: Appropriate      Speech:  normal      Thought Process/Content: Logical      Affective Functioning: Congruent      Mood: anxious      Level of consciousness:  Alert and Oriented x4      Response to Learning: Capable of insight and Progressing to goal      Endings: None Reported    Modes of Intervention: Support, Socialization, and Exploration      Discipline Responsible: /Counselor      Signature:  DANIEL Mccarty

## 2024-06-27 NOTE — GROUP NOTE
Group Therapy Note    Date: 6/27/2024    Group Start Time: 12:00 PM  Group End Time:  1:00 PM  Group Topic: Psychotherapy    [facility] Jorge Lr MSW        Group Therapy Note    Writer facilitated group and started with a discussion on learned helplessness. Patients reflected on childhood experiences that led to a sense of hopelessness. Writer passed out worksheet on using compassion to address and understand symptoms of trauma. Patients were asked to read, reflect, and identify harsh statement to reframe with compassion. Patients completed exercise and shared it with the group. To conclude, this writer facilitated a progressive muscle relaxation meditation.       Attendees: 6      Patient's Goal: to process trauma, to identify coping skills, to reframe negative thoughts, to learn about compassion      Notes:  Pt reflected on not being able to process the death of her father or express her needs to manage social anxiety as a child. Pt shared that for the first time today she was able to eat lunch with her peers in the cafeteria without wanting to isolate. Patient followed along with content and was receptive to feedback. Pt was supportive towards peers and will continue to work towards goal.    Status After Intervention:  Improved    Participation Level: Active Listener    Participation Quality: Appropriate      Speech:  normal      Thought Process/Content: Logical      Affective Functioning: Congruent      Mood: anxious      Level of consciousness:  Alert and Oriented x4      Response to Learning: Able to retain information, Capable of insight, and Progressing to goal      Endings: None Reported    Modes of Intervention: Support, Socialization, and Exploration      Discipline Responsible: /Counselor      Signature:  DANIEL Mccarty

## 2024-06-28 ENCOUNTER — HOSPITAL ENCOUNTER (OUTPATIENT)
Facility: HOSPITAL | Age: 34
Discharge: HOME | End: 2024-07-01
Payer: COMMERCIAL

## 2024-06-28 VITALS — TEMPERATURE: 98.7 F | SYSTOLIC BLOOD PRESSURE: 120 MMHG | HEART RATE: 90 BPM | DIASTOLIC BLOOD PRESSURE: 71 MMHG

## 2024-06-28 PROCEDURE — 90853 GROUP PSYCHOTHERAPY: CPT

## 2024-06-28 NOTE — GROUP NOTE
Group Therapy Note    Date: 6/28/2024    Group Start Time:  9:00 AM  Group End Time:  9:40 AM  Group Topic: Community Meeting    [facility] Jorge Lr MSW        Group Therapy Note    Writer facilitated group and started with requesting patients to complete check in sheets and write an intention on the board to share with peers. Patients were provided with a brain dump worksheet and asked to complete it prior to process group. One patient started processing heart break and flashbacks along with hopelessness. Patients were encouraged to continue processing into the next group.      Attendees: 4      Patient's Goal: to assess safety, to process emotions        Notes:  Pt denied SI/HI/MEHREEN on check in sheet and identified intention of the day was to reduce anxiety to a manageable level so she can do regular activities.  Pt identified stressors as family issues and own feelings. Pt actively listened to peer process situation and provided words of encouragement.    Status After Intervention:  Improved    Participation Level: Active Listener    Participation Quality: Appropriate      Speech:  normal      Thought Process/Content: Logical      Affective Functioning: Congruent      Mood: anxious      Level of consciousness:  Alert and Oriented x4      Response to Learning: Capable of insight and Progressing to goal      Endings: None Reported    Modes of Intervention: Support, Socialization, and Exploration      Discipline Responsible: /Counselor      Signature:  DANIEL Mccarty

## 2024-06-28 NOTE — GROUP NOTE
Group Therapy Note    Date: 6/28/2024    Group Start Time:  2:00 PM  Group End Time:  2:45 PM  Group Topic: Wrap-Up    [facility]    Kait Crenshaw MSW        Group Therapy Note  This writer facilitated a wrap-up group by encouraging the patients to create a weekend schedule using a weekend planner handout. Each patient was provided with a handout and encouraged to identify activities, whether new or old, they would like to try over the weekend. Each patient was asked to complete an outpatient behavioral health wrap-up sheet. Each patient was provided with a copy of their safety plan. After allowing time for this, this facilitator encouraged patients to discuss their thoughts, feelings, and reflections following the activity.       Attendees: 4       Patient's Goal:  To create a weekend schedule     Notes: Pt was present and participated in the PHP group. Pt volunteered to share their response and listened to the support given to them by their peers. Pt expressed that she is looking forward to her weekend being fun, resting up, being present, and relaxing. Pt expressed that this will be accomplished through spending time with her mother and boyfriend and celebrating her mother's birthday. PT provided active listening for peers as they shared their responses to the question.     Status After Intervention:  Improved    Participation Level: Active Listener and Interactive    Participation Quality: Appropriate, Sharing, and Supportive      Speech:  normal      Thought Process/Content: Logical      Affective Functioning: Congruent      Mood: calm      Level of consciousness:  Alert and Oriented x4      Response to Learning: Able to verbalize current knowledge/experience, Able to verbalize/acknowledge new learning, Able to retain information, Capable of insight, and Progressing to goal      Endings: None Reported    Modes of Intervention: Support,

## 2024-06-28 NOTE — GROUP NOTE
Group Therapy Note    Date: 6/28/2024    Group Start Time:  1:10 PM  Group End Time:  2:00 PM  Group Topic: Psychotherapy    [facility] Jorge Lr MSW        Group Therapy Note    Writer facilitated group and started by allowing space for a patient who requested to process relationship with potential boyfriend. Patients processed and supported peer the first fifteen minutes of group. Writer transitioned into asking patients to share their bucket activities. To conclude, patients were asked to write affirmations for one another to take home for the weekend.      Attendees: 4        Patient's Goal: to identify coping skills, to improve well-being, to identify triggers       Notes:  Pt encouraged peer to think about her expectations from relationship and reminded pt of her strengths. Pt shared her bucket and identified cyndy being a huge coping skill and distress tolerance one. Patient will continue to work towards goal.    Status After Intervention:  Improved    Participation Level: Active Listener    Participation Quality: Appropriate      Speech:  normal      Thought Process/Content: Logical      Affective Functioning: Congruent      Mood: anxious      Level of consciousness:  Alert      Response to Learning: Able to verbalize/acknowledge new learning, Capable of insight, and Progressing to goal      Endings: None Reported    Modes of Intervention: Education, Support, and Activity      Discipline Responsible: /Counselor      Signature:  DANIEL Mccarty

## 2024-06-28 NOTE — GROUP NOTE
Group Therapy Note    Date: 6/28/2024    Group Start Time: 12:00 PM  Group End Time:  1:00 PM  Group Topic: Psychotherapy    [facility] Jorge Lr MSW        Group Therapy Note    Writer facilitated group and started with a discussion on self-esteem vs self-compassion. Patients were asked to actively listen to rosio talk on the research behind the two terms. Patients processed their own relationships with self-esteem. Patients were introduced to self-esteem bucket activity and asked to identify triggers, self care activities, and coping skills.       Attendees: 4        Patient's Goal: to improve self compassion, to improve social well being, to identify triggers, to identify coping skills      Notes:  Pt actively watched the video and reflected on how her idea of worth came from messages she heard as a child. Pt shared that her mom also did not know any other way so this pressure to be perfect is passed down. Patient was seen completing esteem bucket and agreeable to share it in the next group.    Status After Intervention:  Improved    Participation Level: Active Listener    Participation Quality: Appropriate      Speech:  normal      Thought Process/Content: Logical      Affective Functioning: Congruent      Mood: anxious      Level of consciousness:  Alert and Oriented x4      Response to Learning: Capable of insight and Progressing to goal      Endings: None Reported    Modes of Intervention: Education, Support, and Activity      Discipline Responsible: /Counselor      Signature:  DANIEL Mccarty

## 2024-06-28 NOTE — GROUP NOTE
Group Therapy Note    Date: 6/28/2024    Group Start Time:  9:40 AM  Group End Time: 10:30 AM  Group Topic: Process Group - Outpatient    [facility]    Kait Crenshaw MSW    Group Therapy Note  This writer facilitated a process group by encouraging the patients to share any stressors or triggers that have recently been impacting them. This writer encouraged the patients to support one another and offer feedback to each other when appropriate. After allowing time for this, this writer encourages patients to create their \"weather week,\" identifying bright points, struggles that occurred over the week, and potential things they are looking toward too. After allowing time for this, this facilitator encouraged patients to discuss their thoughts, feelings, and reflections following the activity.      Attendees: 4       Patient's Goal:  To identify current stressors and complete activity    Notes: PT was present and participated in the PHP group. PT processed her feelings of uncertainty as the weekend approached. Pt volunteered to share their response and listened to the support given to them by their peers. PT provided active listening for peers as they shared their responses to the question. Pt was receptive to the support and suggestions given to her by her peers. PT completed the weather week activity. Pt identified her bright spot as driving in her car alone for 20 minutes, her rainbow as learning more skills in PHP, and her cloudy moment as having her brother home and managing her mother's health concerns.     Status After Intervention:  Improved    Participation Level: Active Listener and Interactive    Participation Quality: Appropriate, Sharing, and Supportive      Speech:  normal      Thought Process/Content: Logical      Affective Functioning: Congruent      Mood: calm      Level of consciousness:  Alert and Oriented x4      Response to

## 2024-06-28 NOTE — GROUP NOTE
Group Therapy Note    Date: 6/28/2024    Group Start Time: 10:40 AM  Group End Time: 11:30 AM  Group Topic: Cognitive Skills    [facility]    Kait Crenshaw MSW        Group Therapy Note  This writer facilitated a cognitive skills group focusing on creating an emotional safety plan. Each patient was encouraged to identify warning signs, build their toolbox with coping skills, self-care strategies, and supportive options, and create an emotional safety plan based upon these answers. After allowing time for this, the facilitator encouraged patients to discuss their thoughts, feelings, and reflections following the activity.      Attendees: 4       Patient's Goal:  To create an emotional safety plan    Notes: PT was presented and participated in the group. PT completed her emotional safety plan. Pt volunteered to provide insight on their answers and reflect on those answers. Pt identified her warning signs as being around her brother, going home, and being alone in public. Pt expressed that once she becomes triggered, she experiences her heart pounding, feeling scared, thinking \"this will never end,\" and feeling stuck. Pt identified various coping skills, such as crying, praying, mediation, and stretching. Pt provided active listening and support to their peers as they shared their reflections based on their responses.     Status After Intervention:  Improved    Participation Level: Active Listener and Interactive    Participation Quality: Appropriate, Sharing, and Supportive      Speech:  normal      Thought Process/Content: Logical      Affective Functioning: Congruent      Mood: calm      Level of consciousness:  Alert and Oriented x4      Response to Learning: Able to verbalize current knowledge/experience, Able to verbalize/acknowledge new learning, Able to retain information, and Capable of insight      Endings: None Reported    Modes of

## 2024-07-01 ENCOUNTER — HOSPITAL ENCOUNTER (OUTPATIENT)
Facility: HOSPITAL | Age: 34
Setting detail: RECURRING SERIES
Discharge: HOME | End: 2024-07-04
Payer: COMMERCIAL

## 2024-07-01 VITALS
SYSTOLIC BLOOD PRESSURE: 111 MMHG | HEART RATE: 86 BPM | TEMPERATURE: 99.1 F | DIASTOLIC BLOOD PRESSURE: 73 MMHG | OXYGEN SATURATION: 99 %

## 2024-07-01 PROCEDURE — 90853 GROUP PSYCHOTHERAPY: CPT

## 2024-07-01 NOTE — GROUP NOTE
Group Therapy Note    Date: 7/1/2024    Group Start Time:  2:00 PM  Group End Time:  2:45 PM  Group Topic: Wrap-Up    [facility]    Kait Crenshaw MSW        Group Therapy Note  This writer facilitated a wrap-up group by encouraging the patients to identify a main takeaway of the day. Each patient was encouraged to go to the board and write down their main takeaway of the day. Each patient was asked to complete an outpatient behavioral health wrap-up sheet and lunch menu.    Attendees: 5       Patient's Goal:  To identify main takeaway    Notes: Pt was present and participated in the PHP group. Pt volunteered to share their response and listened to the support given to them by their peers. Pt identified that her main takeaway of the day was gaining insight. PT provided active listening for peers as they shared their responses to the question.    Status After Intervention:  Improved    Participation Level: Active Listener and Interactive    Participation Quality: Appropriate, Sharing, and Supportive      Speech:  normal      Thought Process/Content: Logical      Affective Functioning: Congruent      Mood: calm      Level of consciousness:  Alert and Oriented x4      Response to Learning: Able to verbalize current knowledge/experience, Able to verbalize/acknowledge new learning, Able to retain information, and Capable of insight      Endings: None Reported    Modes of Intervention: Support, Socialization, and Exploration      Discipline Responsible: /Counselor      Signature:  DANIEL Miller

## 2024-07-01 NOTE — GROUP NOTE
Group Therapy Note    Date: 7/1/2024    Group Start Time:  9:40 AM  Group End Time: 10:30 AM  Group Topic: Process Group - Outpatient    [facility]    Kait Crenshaw MSW        Group Therapy Note  This writer facilitated a process group by encouraging the patients to share any stressors or triggers that have recently been impacting them. This writer encouraged the patients to support one another and offer feedback to each other when appropriate. After allowing time for this, this writer encourages patients to reflect on positive experinces they've had using specific qualities. Each person recieved a handout with various qualities such as, courage, kindness, seflessness, love, and etc. Each person was encouraged to reflect and write about a time in which they've displayed each quality. After allowing time for this, this facilitator encouraged patients to discuss their thoughts, feelings, and reflections following the activity.    Attendees: 5       Patient's Goal:  To identify current stressors and provide support     Notes: PT was present and participated in the PHP group. Pt expressed that she did not have anything to process for the process group. PT completed the positive experience handout. Pt volunteered to share their response and listened to the support given to them by their peers. PT identified that she has displayed courage by being a  for elementary-aged children. PT provided active listening for peers as they shared their responses to the question.     Status After Intervention:  Improved    Participation Level: Active Listener and Interactive    Participation Quality: Appropriate, Sharing, and Supportive      Speech:  normal      Thought Process/Content: Logical      Affective Functioning: Congruent      Mood: calm      Level of consciousness:  Alert and Oriented x4      Response to Learning: Able to verbalize current

## 2024-07-01 NOTE — GROUP NOTE
Group Therapy Note    Date: 7/1/2024    Group Start Time:  9:00 AM  Group End Time:  9:40 AM  Group Topic: Community Meeting    [facility] Jorge Lr MSW        Group Therapy Note    Writer facilitated group and started with requesting patients to complete check in sheets along with menus. Patients were asked to identify an intention for the day and write it on the board. To conclude, patients were asked to complete ivon, bud, and thorn worksheets. Only two out of the five patients were able to share their answers.       Attendees: 5        Patient's Goal: to assess safety, to identify goal for the day          Notes:  Pt denied SI/HI/MEHREEN on check in sheet and stated goal of the day was to learn more coping skills. Patient completed worksheet and shared thorn was feeling hopeless on Saturday and fearing she will never get better. Pt was receptive to feedback and will continue to work towards goal.    Status After Intervention:  Improved    Participation Level: Active Listener    Participation Quality: Appropriate      Speech:  normal      Thought Process/Content: Logical      Affective Functioning: Congruent      Mood: anxious      Level of consciousness:  Alert and Oriented x4      Response to Learning: Progressing to goal      Endings: None Reported    Modes of Intervention: Support, Socialization, and Exploration      Discipline Responsible: /Counselor      Signature:  DANIEL Mccarty

## 2024-07-01 NOTE — GROUP NOTE
Group Therapy Note    Date: 7/1/2024    Group Start Time:  1:10 PM  Group End Time:  2:00 PM  Group Topic: Psychotherapy    [facility] Jorge Lr MSW        Group Therapy Note    Writer facilitated group and started with a discussion on inner and outer masks. Following discussion, writer projected various clips from little miss sunshine and asked patients to reflect on ways families can impact the formation and continued presentation of inner and outer masks.           Attendees: 4        Patient's Goal: to process emotions, to process family dynamics, to improve self-compassion       Notes:  Pt actively listened to clips and her peers. Patient expressed struggling with feeling insecure about some her past choices. Pt reflected on how damage to her inner mask allows for her to be compassionate towards others on the outside. Pt provided support to peers and will continue to work towards goal.    Status After Intervention:  Improved    Participation Level: Active Listener    Participation Quality: Appropriate      Speech:  normal      Thought Process/Content: Logical      Affective Functioning: Congruent      Mood: anxious      Level of consciousness:  Alert and Oriented x4      Response to Learning: Capable of insight and Progressing to goal      Endings: None Reported    Modes of Intervention: Support, Socialization, Exploration, and Activity      Discipline Responsible: /Counselor      Signature:  DANIEL Mccarty

## 2024-07-01 NOTE — GROUP NOTE
Group Therapy Note    Date: 7/1/2024    Group Start Time: 12:00 PM  Group End Time:  1:00 PM  Group Topic: Psychotherapy    [facility]    Kait Crenshaw MSW        Group Therapy Note  This writer facilitated a psychotherapy group providing education on dual diagnosis. This writer facilitated a group using a PowerPoint presentation, providing education on what dual diagnosis is, providing insight on common mental health disorders that it falls under, and suggesting potential interventions for future use. After allowing time for this, this facilitator encouraged patients to discuss their thoughts, feelings, and reflections following the activity.    Attendees: 4       Patient's Goal:  To provide education on dual diagnosis    Notes: PT was present and participated in the PHP group. Pt volunteered to share their response and listened to the support given to them by their peers. The patient expressed that she found the information on dual diagnosis informative and helpful. PT expressed that she did not realize how many Americans were also dealing with anxiety and panic attacks. PT expressed that she feels less alone in her mental health journey. PT provided active listening for peers as they shared their responses to the question.     Status After Intervention:  Improved    Participation Level: Active Listener and Interactive    Participation Quality: Appropriate, Sharing, and Supportive      Speech:  normal      Thought Process/Content: Logical      Affective Functioning: Congruent      Mood: calm      Level of consciousness:  Alert and Oriented x4      Response to Learning: Able to verbalize current knowledge/experience, Able to verbalize/acknowledge new learning, Able to retain information, Capable of insight, and Progressing to goal      Endings: None Reported    Modes of Intervention: Education, Support, and Exploration      Discipline

## 2024-07-01 NOTE — GROUP NOTE
Group Therapy Note    Date: 7/1/2024    Group Start Time: 10:40 AM  Group End Time: 11:30 AM  Group Topic: Psychotherapy    [facility] Jorge Lr MSW        Group Therapy Note    Writer facilitated group and started by projecting an analysis on the rise of guardians for \"Movie Clip Monday\" theme. Patients actively listened and reflected on themes such as identify vs. Role confusion along with intimacy vs. Isolation.       Attendees: 5          Patient's Goal: to learn about identity and role confusion, to process emotions     Notes:  Pt was attentive to the video and shared her experience with navigating different roles in her 20s vs 30s. Pt shared wanting to go into acting growing up but not finding roles that paid enough. Pt shared her anxiety also played a role in identity work. Patient was receptive to feedback and will continue to work towards goal.    Status After Intervention:  Improved    Participation Level: Active Listener    Participation Quality: Appropriate      Speech:  normal      Thought Process/Content: Logical      Affective Functioning: Congruent      Mood: anxious      Level of consciousness:  Alert and Oriented x4      Response to Learning: Capable of insight and Progressing to goal      Endings: None Reported    Modes of Intervention: Support, Exploration, and Activity      Discipline Responsible: /Counselor      Signature:  DANIEL Mccarty

## 2024-07-02 ENCOUNTER — HOSPITAL ENCOUNTER (OUTPATIENT)
Facility: HOSPITAL | Age: 34
Discharge: HOME | End: 2024-07-05
Payer: COMMERCIAL

## 2024-07-02 VITALS
TEMPERATURE: 98.1 F | OXYGEN SATURATION: 100 % | HEART RATE: 87 BPM | SYSTOLIC BLOOD PRESSURE: 111 MMHG | DIASTOLIC BLOOD PRESSURE: 79 MMHG

## 2024-07-02 NOTE — GROUP NOTE
Group Therapy Note    Date: 7/2/2024    Group Start Time: 10:40 AM  Group End Time: 11:30 AM  Group Topic: Cognitive Skills    Jorge Gamboa MSW        Group Therapy Note    Writer facilitated group and started with introducing the topic \" psychological flexibility.\" Patients were asked to complete a survey that provided a psychological flexibility score at the end. Patients were asked to assess their ability to stay present, commitment, values, self as context, defusion, and acceptance. Patients completed survey and reflected on answers out loud. Writer was unable to review skill sheet on flexibility and informed patients that it would be done in the next group by writer.       Attendees: 6        Patient's Goal: to learn about psychological flexibility, to assess commitment to values        Notes:  Pt completed worksheet and shared being able to stay present in groups but on the outside struggling with ruminating thoughts about both past and future. Patient actively listened to peers and provided them feedback. Pt will continue to work towards goal.    Status After Intervention:  Improved    Participation Level: Active Listener    Participation Quality: Appropriate      Speech:  normal      Thought Process/Content: Logical      Affective Functioning: Congruent      Mood: anxious      Level of consciousness:  Alert and Oriented x4      Response to Learning: Capable of insight and Progressing to goal      Endings: None Reported    Modes of Intervention: Education, Support, and Exploration      Discipline Responsible: /Counselor      Signature:  DANIEL Mccarty

## 2024-07-02 NOTE — GROUP NOTE
Group Therapy Note    Date: 7/2/2024    Group Start Time:  9:40 AM  Group End Time: 10:30 AM  Group Topic: Process Group - Outpatient    [facility]    Kait Crenshaw MSW        Group Therapy Note  This writer facilitated a process group by encouraging the patients to share any stressors that have recently been impacting them. This writer encouraged the patients to support one another and offer feedback to each other when appropriate. After allowing time for this, this writer used open-ended mental health process questions to prompt insight and socialization amongst the patients.     Attendees: 5       Patient's Goal: To identify current stressors and provide support     Notes: PT was present and participated in the PHP group. Pt identified that she did not have anything to cover in the process group. PT provided active listening for peers as they shared their responses to the question. Pt provided various suggestions and interventions to her peers as they shared their stressors and triggers.      Status After Intervention:  Improved    Participation Level: Active Listener and Interactive    Participation Quality: Appropriate, Sharing, and Supportive      Speech:  normal      Thought Process/Content: Logical      Affective Functioning: Congruent      Mood: calm      Level of consciousness:  Alert and Oriented x4      Response to Learning: Able to verbalize current knowledge/experience, Able to verbalize/acknowledge new learning, Able to retain information, and Capable of insight      Endings: None Reported    Modes of Intervention: Support, Socialization, and Exploration      Discipline Responsible: /Counselor      Signature:  DANIEL Miller

## 2024-07-02 NOTE — GROUP NOTE
Group Therapy Note    Date: 7/2/2024    Group Start Time: 12:00 PM  Group End Time:  1:00 PM  Group Topic: Psychotherapy    [facility]    Kait Crenshaw MSW        Group Therapy Note  This writer facilitated a psycho-educational group providing education on challenging intrusive thoughts. This facilitator provided each patient with an interactive handout worksheet that included a thought diary. Each patient was encouraged to fill out the thought diary in groups or wait for real-time usage. After allowing time for this, this facilitator encouraged patients to discuss their thoughts, feelings, and reflections following the activity.    Attendees: 6       Patient's Goal:  To challenge intrusive thoughts    Notes: PT was present and participated in the PHP group. Pt completed the challenging anxious thoughts handout. Pt volunteered to share their response and listened to the support given to them by their peers. PT provided active listening for peers as they shared their responses to the question. PT was able to identify thoughts she experiences during moments of high anxiety. PT identified these thoughts as \"I don't want to do this\" or \"these symptoms are scary.\".     Status After Intervention:  Improved    Participation Level: Active Listener and Interactive    Participation Quality: Appropriate, Sharing, and Supportive      Speech:  normal      Thought Process/Content: Logical      Affective Functioning: Congruent      Mood: calm      Level of consciousness:  Alert and Oriented x4      Response to Learning: Able to verbalize current knowledge/experience, Able to verbalize/acknowledge new learning, Able to retain information, Capable of insight, and Progressing to goal      Endings: None Reported    Modes of Intervention: Education, Support, and Socialization      Discipline Responsible: /Counselor      Signature:  Kait Crenshaw

## 2024-07-02 NOTE — GROUP NOTE
Group Therapy Note    Date: 7/2/2024    Group Start Time:  1:10 PM  Group End Time:  2:00 PM  Group Topic: Psychoeducation    Jorge Gamboa MSW        Group Therapy Note    Writer facilitated group and started with continuing conversation about psychological flexibility. Patients were asked to read the 6 different processes that assist with flexibility and identify actions (jot on the board) they can do to help. To conclude, patients were encouraged to identify one situation that anxiety is preventing them to do and identify one step from the processes they can do to help change the negative impact of anxiety.      Attendees: 5      Patient's Goal: to identify coping skills, to learn about diffusion, to identify triggers, to identify skills to help reframe distortions      Notes:  Pt was pulled the first thirty minutes by DANIEL Miller for an individual session. Patient returned and completed anxiety worksheet and identified anxiety preventing her from doing social outings with family. Pt shared she would be attending a baseball game tomorrow and will take it one step at a time to work towards change.    Status After Intervention:  Unchanged    Participation Level: Active Listener    Participation Quality: Appropriate      Speech:  normal      Thought Process/Content: Logical      Affective Functioning: Congruent      Mood: anxious      Level of consciousness:  Alert and Oriented x4      Response to Learning: Progressing to goal      Endings: None Reported    Modes of Intervention: Education and Support      Discipline Responsible: /Counselor      Signature:  DANIEL Mccarty

## 2024-07-02 NOTE — BH NOTE
Partial Hospitalization Program Individual Psychotherapy Note      Diagnosis: F41.9: Anxiety, unspecified    Goal:  This writer met with the PT to review and collaborate to provide updates to his treatment plan goals. This writer conducted an individual session to inquire about PT progress in groups and provide updates, if needed.         Psychotherapy Session    Start time: 1:15  Stop time: 1:45        Patient Mental Status and Mood/Affect:Calm, Congruent, and Happy    Patient Behavior and Appearance: Cooperative and Good eye contactshows no evidence of impairment    Intervention/Techniques: Informed, Validated/Supported, Reflected, Guided, Listened/Empathized, Observed/Monitored, Queired/Probed, Promoted Peer Support, and Facilitated Disclosure    Focus of Session/Patient Response and Progress Towards Goal: Pt was engaged in session as evidence of her ability to reflect on her symptoms and identify areas of need.     Narrative: This writer met with the patient to review and update his treatment plan. This writer discussed the current treatment goals and objectives with the patient and added in new progress made towards each goal. This writer began the session with a check-in to gain insight into the patient's current mood and how the patient has been adjusting to groups. The PT informed this writer that she has been enjoying groups and the content that has been covered so far. The pt informed this writer that she has been able to take the tools that she has learned in groups and use them as feelings of anxiety and triggers arise. The pt informed this writer that she has been talking about what she has learned in group to her family and has been reviewing the material after group as well. This writer provided verbal validation to the PT for being able to challenge herself and utilize the skills she is learning in group. This writer began inquiring about the PT's goals and the progress being made towards each goal.

## 2024-07-02 NOTE — GROUP NOTE
Group Therapy Note    Date: 7/2/2024    Group Start Time:  9:00 AM  Group End Time:  9:40 AM  Group Topic: Community Meeting    [facility]    Kait Crenshaw MSW        Group Therapy Note  This writer facilitated a community group focusing on identifying coping skills. Each patient was provided with a coping skills handout asking them to identify coping skills in alphabetical order. After allowing time for this, this writer asked each patient to identify a coping skill with the correlating letter and put it on the board. This facilitator provided each patient with an outpatient check-in sheet and a lunch menu to complete. After allowing time for this, the facilitator encouraged patients to discuss their thoughts, feelings, and reflections following the activity.    Attendees: 5       Patient's Goal: To identify coping skills    Notes:  Pt was present and participated in the PHP group. Pt completed coping skills handout. Pt volunteered to share their response and listened to the support given to them by their peers. Pt identified various coping skills that she uses or would like to try. Pt identified coping skills such as listening to music, walking around the park, and taking a nap. PT provided active listening for peers as they shared their responses to the question.     Status After Intervention:  Improved    Participation Level: Active Listener and Interactive    Participation Quality: Appropriate, Sharing, and Supportive      Speech:  normal      Thought Process/Content: Logical      Affective Functioning: Congruent      Mood: calm      Level of consciousness:  Alert and Oriented x4      Response to Learning: Able to verbalize current knowledge/experience, Able to verbalize/acknowledge new learning, Able to retain information, Capable of insight, and Progressing to goal      Endings: None Reported    Modes of Intervention: Support,

## 2024-07-02 NOTE — GROUP NOTE
Group Therapy Note    Date: 7/2/2024    Group Start Time:  2:00 PM  Group End Time:  2:45 PM  Group Topic: Wrap-Up    [facility]    Jorge Clifton MSW        Group Therapy Note    Writer facilitated group and started with requesting patients to complete wrap up and menu sheets. Patients were reminded that [facility] will be closed on July 4th. Patients shared what went well for them today with peers. To conclude, writer requested patients to identify one song that represents how they are feeling. Each song with the flory was projected on the screen and patients were encouraged to reflect on why they chose the song.      Attendees: 5        Patient's Goal: to process emotions, to assess safety, to identify takeaways      Notes:  Patient expressed feeling sad about one of her peers leaving. Patient shared that connecting with peers went well today. Patient denied SI/HI and participated in the music activity by playing the song \"little hands.\"    Status After Intervention:  Improved    Participation Level: Active Listener    Participation Quality: Appropriate      Speech:  normal      Thought Process/Content: Logical      Affective Functioning: Congruent      Mood: anxious      Level of consciousness:  Alert and Oriented x4      Response to Learning: Able to retain information, Capable of insight, and Progressing to goal      Endings: None Reported    Modes of Intervention: Support, Socialization, and Exploration      Discipline Responsible: /Counselor      Signature:  DANIEL Mccarty

## 2024-07-03 ENCOUNTER — HOSPITAL ENCOUNTER (OUTPATIENT)
Facility: HOSPITAL | Age: 34
Discharge: HOME | End: 2024-07-06
Payer: COMMERCIAL

## 2024-07-03 VITALS
SYSTOLIC BLOOD PRESSURE: 112 MMHG | OXYGEN SATURATION: 100 % | TEMPERATURE: 99.1 F | HEART RATE: 98 BPM | DIASTOLIC BLOOD PRESSURE: 74 MMHG

## 2024-07-03 PROCEDURE — 90853 GROUP PSYCHOTHERAPY: CPT

## 2024-07-03 NOTE — GROUP NOTE
Group Therapy Note    Date: 7/3/2024    Group Start Time: 10:40 AM  Group End Time: 11:30 AM  Group Topic: Cognitive Skills    [facility]    Kait Crenshaw MSW        Group Therapy Note  This writer facilitated a cognitive skills group focusing on identifying the difference between the inner critic and the inner . Each patient was encouraged to draw on their inner critic and , along with the thoughts they experienced from both. Each patient was provided an example picture to help with curating ideas or themes for their future. After allowing time for this, this writer encourages patients to reflect on their drawings.     Attendees: 7       Patient's Goal: To identify inner critic vs inner     Notes: PT was present and participated in the group. PT provided insight into what she thought an inner critic was. Pt was receptive to her peers as they shared their responses. Pt began working on her drawing to illustrate her inner critic vs. inner .     Status After Intervention:  Improved    Participation Level: Active Listener and Interactive    Participation Quality: Appropriate, Sharing, and Supportive      Speech:  normal      Thought Process/Content: Logical      Affective Functioning: Congruent      Mood: calm      Level of consciousness:  Alert and Oriented x4      Response to Learning: Able to verbalize current knowledge/experience, Able to verbalize/acknowledge new learning, Able to retain information, Capable of insight, and Progressing to goal      Endings: None Reported    Modes of Intervention: Education, Support, and Socialization      Discipline Responsible: /Counselor      Signature:  DANIEL Miller

## 2024-07-03 NOTE — GROUP NOTE
Group Therapy Note    Date: 7/3/2024    Group Start Time:  2:00 PM  Group End Time:  2:45 PM  Group Topic: Wrap-Up    [facility] Kait Love MSW        Group Therapy Note  This writer facilitated a wrap-up group focusing on identifying values. Each patient was provided with a value list and asked to rate them 1-10, with 1 being the most important and 10 being the least important. Each patient was provided with an outpatient check-out sheet. After allowing time for this, this writer encourages patients to reflect on their answers.     Attendees: 7       Patient's Goal: To identify values    Notes: Pt was present and participated in group. Pt identified her most important value as spirituality and her least important as popularity. Pt provided active listening to her peers as they shared their responses.    Status After Intervention:  Improved    Participation Level: Active Listener and Interactive    Participation Quality: Appropriate, Sharing, and Supportive      Speech:  normal      Thought Process/Content: Logical      Affective Functioning: Congruent      Mood: calm      Level of consciousness:  Alert and Oriented x4      Response to Learning: Able to verbalize current knowledge/experience, Able to verbalize/acknowledge new learning, Able to retain information, Capable of insight, and Progressing to goal      Endings: None Reported    Modes of Intervention: Support, Socialization, and Exploration      Discipline Responsible: /Counselor      Signature:  DANIEL Miller
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
## 2024-07-03 NOTE — GROUP NOTE
Group Therapy Note    Date: 7/3/2024    Group Start Time:  1:10 PM  Group End Time:  2:00 PM  Group Topic: Psychoeducation    [facility] Kait Love MSW        Group Therapy Note  This writer facilitated a psychoeducational group focusing on the inner critic and inner . This writer encouraged patients to present their drawings to their peers and explained the differences between their inner critic and inner . After allowing time for this, this writer encourages the patients to reflect on the activity.     Attendees: 6       Patient's Goal: To present inner critic vs inner  drawing    Notes: PT was present and participated in the group. Pt presented a drawing to her peers. Pt identified that she rudy a box to represent her inner critic due to its rigid thinking and emotions. Pt expressed that she rudy a Saxman to represent the soft side of her, which is her inner .     Status After Intervention:  Improved    Participation Level: Active Listener and Interactive    Participation Quality: Appropriate, Sharing, and Supportive      Speech:  normal      Thought Process/Content: Logical      Affective Functioning: Congruent      Mood: calm      Level of consciousness:  Alert and Oriented x4      Response to Learning: Able to verbalize current knowledge/experience, Able to verbalize/acknowledge new learning, Able to retain information, Capable of insight, and Progressing to goal      Endings: None Reported    Modes of Intervention: Education, Support, and Socialization      Discipline Responsible: /Counselor      Signature:  DANIEL Miller

## 2024-07-03 NOTE — GROUP NOTE
Group Therapy Note    Date: 7/3/2024    Group Start Time:  9:00 AM  Group End Time:  9:40 AM  Group Topic: Community Meeting    [facility] Jorge Lr MSW        Group Therapy Note    Writer facilitated group and started with requesting patients to complete check in sheet and identify an intention for the day. Patients were provided with a brain dump worksheet and asked to identify situation they may want to process into the next group. Patients completed worksheet and one of them got the chance to start processing.       Attendees: 6        Patient's Goals: to assess safety, to process emotions, to practice thought diffusion       Notes:  Pt denied SI/HI/MEHREEN on check in sheet and identified intention of the day was to process last night. Pt completed brain dump worksheet and started processing panic attack from reading the letter insurance sent denying approval for treatment. Patient appeared to become tearful and expressed feeling frustrated. Pt shared having a hard time doing ADLs and fearing that if she cannot continue program she might continue to spiral as she just started to get footing. Patient continued processing into the next group.    Status After Intervention:  Improved    Participation Level: Active Listener    Participation Quality: Appropriate      Speech:  normal      Thought Process/Content: Logical      Affective Functioning: Congruent      Mood: anxious      Level of consciousness:  Alert and Oriented x4      Response to Learning: Capable of insight and Progressing to goal      Endings: None Reported    Modes of Intervention: Support, Socialization, and Exploration      Discipline Responsible: /Counselor      Signature:  DANIEL Mccarty

## 2024-07-03 NOTE — GROUP NOTE
Group Therapy Note    Date: 7/3/2024    Group Start Time: 12:00 PM  Group End Time:  1:00 PM  Group Topic: Psychotherapy    [facility] Jorge Lr MSW        Group Therapy Note    Writer facilitated group and started with an emotion check in. Patients were asked to encouraged to play Kahoot and challenge each other on answering questions related to stress management, coping skills, and feeling identification. Patients answered questions and reflected on multiple strategies they have tried in the past.         Attendees: 7        Patient's Goal: to identify coping skills, to engage with peers, to process feelings      Notes:  Pt participated in activities and was supportive towards peers. Patient shared her experience the emotion calm being an uncomfortable emotion rather than comfortable. Pt will continue to be encouraged towards goal.    Status After Intervention:  Improved    Participation Level: Active Listener    Participation Quality: Appropriate      Speech:  normal      Thought Process/Content: Logical      Affective Functioning: Congruent      Mood: anxious      Level of consciousness:  Alert and Oriented x4      Response to Learning: Capable of insight and Progressing to goal      Endings: None Reported    Modes of Intervention: Education, Support, Socialization, and Exploration      Discipline Responsible: /Counselor      Signature:  DANIEL Mccarty

## 2024-07-05 ENCOUNTER — HOSPITAL ENCOUNTER (OUTPATIENT)
Facility: HOSPITAL | Age: 34
Discharge: HOME | End: 2024-07-08
Payer: COMMERCIAL

## 2024-07-05 VITALS
DIASTOLIC BLOOD PRESSURE: 71 MMHG | SYSTOLIC BLOOD PRESSURE: 123 MMHG | OXYGEN SATURATION: 100 % | TEMPERATURE: 98 F | HEART RATE: 85 BPM

## 2024-07-05 PROCEDURE — 90853 GROUP PSYCHOTHERAPY: CPT

## 2024-07-05 NOTE — GROUP NOTE
Group Therapy Note    Date: 7/5/2024    Group Start Time: 10:40 AM  Group End Time: 11:30 AM  Group Topic: Cognitive Skills    [facility] Jorge Lr MSW        Group Therapy Note    Writer facilitated group and started with an emotion check in. Patients were provided with instructions on how to create a coping skills card. Patients were given a list of coping skills to choose from if needed. Patients were provided with art supplies to create card. Patients completed cards and shared them with peers.      Attendees: 6        Patient's Goal: to identify coping skills, to practice coping skills    Notes:  Pt shared her personal coping card she took the baseball game which included phrases and skills. Patient was seen transferring into her new card and also asked peers for more grounding skills. Pt was receptive to feedback and will continue to work towards goal.    Status After Intervention:  Improved    Participation Level: Active Listener    Participation Quality: Appropriate      Speech:  normal      Thought Process/Content: Logical      Affective Functioning: Congruent      Mood: anxious      Level of consciousness:  Alert and Oriented x4      Response to Learning: Capable of insight and Progressing to goal      Endings: None Reported    Modes of Intervention: Education, Support, Socialization, and Exploration      Discipline Responsible: /Counselor      Signature:  DANIEL Mccarty

## 2024-07-05 NOTE — GROUP NOTE
Group Therapy Note    Date: 7/5/2024    Group Start Time:  2:00 PM  Group End Time:  2:45 PM  Group Topic: Wrap-Up    [facility]    Kait Crenshaw MSW        Group Therapy Note  This writer facilitated a wrap-up group by encouraging the patients to identify a main take away of the day. Each patient was asked to complete an outpatient behavioral health wrap-up sheet. Each patient was provided with a copy of their safety plan. After allowing time for this, this facilitator encouraged patients to discuss their thoughts, feelings, and reflections following the activity.     Attendees: 5       Patient's Goal:  To identify a main takeaway     Notes: Pt was present and participated in the PHP group. Pt volunteered to share their response and listened to the support given to them by their peers. Pt identified her main takeaways of the day are learning about coping skills, identifying triggers, and gaining insight. PT provided active listening for peers as they shared their responses to the question.    Status After Intervention:  Improved    Participation Level: Active Listener and Interactive    Participation Quality: Appropriate, Sharing, and Supportive      Speech:  normal      Thought Process/Content: Logical      Affective Functioning: Congruent      Mood: calm      Level of consciousness:  Alert and Oriented x4      Response to Learning: Able to verbalize current knowledge/experience, Able to verbalize/acknowledge new learning, Able to retain information, and Capable of insight      Endings: None Reported    Modes of Intervention: Support, Socialization, and Exploration      Discipline Responsible: /Counselor      Signature:  DANIEL Miller

## 2024-07-05 NOTE — GROUP NOTE
Group Therapy Note    Date: 7/5/2024    Group Start Time: 12:00 PM  Group End Time:  1:00 PM  Group Topic: Psychoeducation    [facility]    Kait Crenshaw MSW        Group Therapy Note  This writer facilitated a cognitive skills group focusing on educating people on the importance of mental health. Each participant was encouraged to watch a clip that shows how an individual can overcome mental health struggles and discuss mental health. After allowing time for this, the facilitator encouraged patients to discuss their thoughts, feelings, and reflections following the activity.    Attendees: 5       Patient's Goal: To discuss the importance of mental health    Notes: PT was presented and participated in the group. Pt volunteered to provide insight on their answers and reflect on those answers. Pt provided insight into the lack of resources and awareness about mental health programs in her local area. Pt expressed that she wished more people knew about PHP treatment and how beneficial it can be. Pt provided active listening and support to their peers as they shared their reflections based on their responses.     Status After Intervention:  Improved    Participation Level: Active Listener and Interactive    Participation Quality: Appropriate, Sharing, and Supportive      Speech:  normal      Thought Process/Content: Logical      Affective Functioning: Congruent      Mood: calm      Level of consciousness:  Alert and Oriented x4      Response to Learning: Able to verbalize current knowledge/experience, Able to verbalize/acknowledge new learning, Able to retain information, and Capable of insight      Endings: None Reported    Modes of Intervention: Education, Support, Socialization, and Exploration      Discipline Responsible: /Counselor      Signature:  DANIEL Miller

## 2024-07-05 NOTE — GROUP NOTE
Group Therapy Note    Date: 7/5/2024    Group Start Time:  1:10 PM  Group End Time:  2:00 PM  Group Topic: Psychotherapy    [facility] Jorge Lr MSW        Group Therapy Note    Writer facilitated group and started with encouraging patients to reflect on content started in previous group. Patients were showed more clips on how individuals struggling with mental illness can find meaning and purpose through the power of connection. Patients were asked to reflect on what group therapy means for them and the impact it has had so far on their own journeys.           Attendees: 5        Patient's Goal: to process emotions, to improve social well being       Notes:  Pt appeared tearful while watching clips and shared one of the things that makes her more compassionate at work is because she has struggled herself with mental health. Patient engaged in discussion with peers and was receptive to content.    Status After Intervention:  Improved    Participation Level: Active Listener    Participation Quality: Appropriate      Speech:  normal      Thought Process/Content: Logical      Affective Functioning: Congruent      Mood: anxious      Level of consciousness:  Alert      Response to Learning: Capable of insight and Progressing to goal      Endings: None Reported    Modes of Intervention: Education, Support, and Socialization      Discipline Responsible: /Counselor      Signature:  DANIEL Mccarty

## 2024-07-05 NOTE — GROUP NOTE
Group Therapy Note    Date: 7/5/2024    Group Start Time:  9:00 AM  Group End Time:  9:40 AM  Group Topic: Community Meeting    [facility] Jorge Lr MSW        Group Therapy Note    Writer facilitated group and started with requesting patients to complete wrap up sheets. Patients were asked to identify an intention for the day and right it on the board. Patients requested to process their holidays and peer discharging today. Patients were unable to finish processing and were encouraged to continue into the next group.      Attendees: 5        Patient's Goal: to assess safety, to identity an intention, to process emotions        Notes:  Pt denied SI/HI/MEHREEN on check in sheet and shared goal of the day was to continue learning coping skills. Patient started to cry when processing peer leaving and expressed fearing that she will not be able to apply skills when she is done with program. Pt was receptive to support and feedback.    Status After Intervention:  Improved    Participation Level: Active Listener    Participation Quality: Appropriate      Speech:  normal      Thought Process/Content: Logical      Affective Functioning: Congruent      Mood: anxious      Level of consciousness:  Alert and Oriented x4      Response to Learning: Capable of insight and Progressing to goal      Endings: None Reported    Modes of Intervention: Support, Socialization, and Exploration      Discipline Responsible: /Counselor      Signature:  DANIEL Mccarty

## 2024-07-05 NOTE — GROUP NOTE
Group Therapy Note    Date: 7/5/2024    Group Start Time:  9:40 AM  Group End Time: 10:30 AM  Group Topic: Process Group - Outpatient    [facility]    Kait Crenshaw MSW        Group Therapy Note  This writer facilitated a process group by encouraging the patients to share any stressors or triggers that have recently been impacting them. This writer encouraged the patients to support one another and offer feedback to each other when appropriate.  After allowing time for this, this facilitator encouraged patients to discuss their thoughts, feelings, and reflections following the activity.    Attendees: 6       Patient's Goal:  To identify current stressors and provide support     Notes: PT was present and participated in the PHP group. Pt volunteered to share their response and listened to the support given to them by their peers. Pt provided insight into her experiences while attending a baseball game on Wednesday night. PT provided insight into how she was able to regulate her emotions and anxiety despite feeling uncomfortable.     Status After Intervention:  Improved    Participation Level: Active Listener and Interactive    Participation Quality: Appropriate, Sharing, and Supportive      Speech:  normal      Thought Process/Content: Logical      Affective Functioning: Congruent      Mood: calm      Level of consciousness:  Alert and Oriented x4      Response to Learning: Able to verbalize current knowledge/experience, Able to verbalize/acknowledge new learning, Able to retain information, and Capable of insight      Endings: None Reported    Modes of Intervention: Support, Socialization, and Exploration      Discipline Responsible: /Counselor      Signature:  DANIEL Miller

## 2024-07-08 ENCOUNTER — HOSPITAL ENCOUNTER (OUTPATIENT)
Facility: HOSPITAL | Age: 34
Discharge: HOME | End: 2024-07-11
Payer: COMMERCIAL

## 2024-07-08 VITALS
SYSTOLIC BLOOD PRESSURE: 106 MMHG | OXYGEN SATURATION: 100 % | HEART RATE: 85 BPM | DIASTOLIC BLOOD PRESSURE: 71 MMHG | TEMPERATURE: 98.5 F

## 2024-07-08 PROCEDURE — 90853 GROUP PSYCHOTHERAPY: CPT

## 2024-07-08 NOTE — GROUP NOTE
Group Therapy Note    Date: 7/8/2024    Group Start Time:  9:00 AM  Group End Time:  9:40 AM  Group Topic: Community Meeting    [facility] Kait Love MSW        Group Therapy Note  This writer facilitated a community group focusing on reflection skills. Each patient was asked to provide the ivon, bud, and thorn of their weekend. This writer encouraged the patients to support one another and offer feedback to each other when appropriate. After allowing time for this, the facilitator encouraged patients to discuss their thoughts, feelings, and reflections following the activity.    Attendees: 5       Patient's Goal:  To reflect on events that occurred over the weekend    Notes: PT was present and participated in the group. Pt identified their ivon, thorn, and bud from their weekend. Pt provided insight that she spent time with her family over the weekend as her brother heads back to his home state. Pt indicated that she struggled with deciding whether she had the autonomy to say \"no\" to her family members.     Status After Intervention:  Improved    Participation Level: Active Listener and Interactive    Participation Quality: Appropriate, Sharing, and Supportive      Speech:  normal      Thought Process/Content: Logical      Affective Functioning: Congruent      Mood: calm      Level of consciousness:  Alert and Oriented x4      Response to Learning: Able to verbalize current knowledge/experience, Able to verbalize/acknowledge new learning, Able to retain information, and Capable of insight      Endings: None Reported    Modes of Intervention: Support, Socialization, and Exploration      Discipline Responsible: /Counselor      Signature:  DANIEL Miller

## 2024-07-08 NOTE — GROUP NOTE
Group Therapy Note    Date: 7/8/2024    Group Start Time:  1:10 PM  Group End Time:  2:00 PM  Group Topic: Psychoeducation    [facility] Kait Love MSW        Group Therapy Note  This writer facilitated a psychoeducation group focusing on challenging automatic thoughts. Each patient was provided with a handout to identify triggers, automatic thoughts, and how to reframe these thoughts. After allowing time for this, the facilitator encouraged patients to discuss their thoughts, feelings, and reflections following the activity.    Attendees: 3         Patient's Goal: To identify automatic thoughts and challenge them    Notes: PT was present and participated in the group. Pt identified automatic thoughts she experiences around her triggers. Pt identified one of her triggers as seeing individuals with large friend groups. Pt expressed that she begins to have automatic thoughts such as, \"I do not level up to the people around me.\" Pt was able to reframe her negative thoughts into positive ones.     Status After Intervention:  Improved    Participation Level: Active Listener and Interactive    Participation Quality: Appropriate, Sharing, and Supportive      Speech:  normal      Thought Process/Content: Logical      Affective Functioning: Congruent      Mood: calm      Level of consciousness:  Alert and Oriented x4      Response to Learning: Able to verbalize current knowledge/experience, Able to verbalize/acknowledge new learning, Able to retain information, Able to change behavior, and Progressing to goal      Endings: None Reported    Modes of Intervention: Education, Socialization, and Exploration      Discipline Responsible: /Counselor      Signature:  DANIEL Miller

## 2024-07-08 NOTE — GROUP NOTE
Group Therapy Note    Date: 7/8/2024    Group Start Time: 10:40 AM  Group End Time: 11:30 AM  Group Topic: Cognitive Skills    [facility] Kait Love MSW        Group Therapy Note  This writer facilitated a cognitive skills group foucsing on challenging the inner critic. Each patient was provided a handout with instructions to identify self critical triggers, thoughts, and emotions. Each patient were instructed to reframe their thoughts and feelings using their inner . After allowing time for this, the facilitator encouraged patients to discuss their thoughts, feelings, and reflections following the activity.      Attendees: 4       Patient's Goal: To challenge inner critic and use inner      Notes: PT was present and participated in the group. PT identified various triggers that bring about self-critical thoughts and feelings. Pt identified that she experiences emotions such as loneliness, sadness, and embarrassment during those instances.     Status After Intervention:  Improved    Participation Level: Active Listener and Interactive    Participation Quality: Appropriate, Sharing, and Supportive      Speech:  normal      Thought Process/Content: Logical      Affective Functioning: Congruent      Mood: calm      Level of consciousness:  Alert and Oriented x4      Response to Learning: Able to verbalize current knowledge/experience, Able to verbalize/acknowledge new learning, Capable of insight, Able to change behavior, and Progressing to goal      Endings: None Reported    Modes of Intervention: Education, Socialization, and Exploration      Discipline Responsible: /Counselor      Signature:  DANIEL Miller

## 2024-07-08 NOTE — GROUP NOTE
Group Therapy Note    Date: 7/8/2024    Group Start Time:  9:40 AM  Group End Time: 10:30 AM  Group Topic: Process Group - Outpatient    [facility]    Jorge Clifton MSW        Group Therapy Note    Writer facilitated process group and continued discussion from previous group. Patients supported and related to peers processing ex-relationships, medicaid transportation, and nightmares.          Attendees: 4        Patient's Goal: to process emotions, to improve social well being, to communicate needs       Notes:  Pt encouraged peer to put mementos from ex-relationship in a box and put it on a shelf away. Pt shared doing that for her past relationship and feeling comforted that it is still there but is not giving it any more energy. Pt shared green and red flags within relationships and will continue to be encouraged towards goal.    Status After Intervention:  Improved    Participation Level: Active Listener    Participation Quality: Appropriate      Speech:  normal      Thought Process/Content: Logical      Affective Functioning: Congruent      Mood: anxious      Level of consciousness:  Alert and Oriented x4      Response to Learning: Capable of insight and Progressing to goal      Endings: None Reported    Modes of Intervention: Support, Socialization, and Exploration      Discipline Responsible: /Counselor      Signature:  DANIEL Mccarty

## 2024-07-08 NOTE — GROUP NOTE
Group Therapy Note    Date: 7/8/2024    Group Start Time: 12:00 PM  Group End Time:  1:00 PM  Group Topic: Relapse Prevention    [facility] Jorge Lr MSW        Group Therapy Note    Writer facilitated group and introduced group topic. Three patients requested to process events from this past weekend that they did not get to vent about in previous groups. The rest of group was spent processing, relating, and sharing advice.         Attendees: 3          Patient's Goal: to process emotions, engage with peers    Notes:  Pt processed not being able to say no to her mom and set boundaries with her because of guilt. Patient also processed feeling frustrated at being caretaker but also not wanting to let mom alone. Patient was receptive to support and feedback.    Status After Intervention:  Improved    Participation Level: Active Listener    Participation Quality: Appropriate      Speech:  normal      Thought Process/Content: Logical      Affective Functioning: Congruent      Mood: anxious      Level of consciousness:  Alert and Oriented x4      Response to Learning: Capable of insight and Progressing to goal      Endings: None Reported    Modes of Intervention: Support, Socialization, and Exploration      Discipline Responsible: /Counselor      Signature:  DANIEL Mccarty

## 2024-07-09 ENCOUNTER — HOSPITAL ENCOUNTER (OUTPATIENT)
Facility: HOSPITAL | Age: 34
Discharge: HOME | End: 2024-07-12
Payer: COMMERCIAL

## 2024-07-09 VITALS
HEART RATE: 77 BPM | DIASTOLIC BLOOD PRESSURE: 75 MMHG | SYSTOLIC BLOOD PRESSURE: 119 MMHG | OXYGEN SATURATION: 100 % | TEMPERATURE: 98.8 F

## 2024-07-09 PROCEDURE — 90832 PSYTX W PT 30 MINUTES: CPT

## 2024-07-09 PROCEDURE — 90853 GROUP PSYCHOTHERAPY: CPT

## 2024-07-09 NOTE — GROUP NOTE
Group Therapy Note    Date: 7/9/2024    Group Start Time: 12:00 PM  Group End Time:  1:00 PM  Group Topic: Psychotherapy    [facility]    Kait Crenshaw MSW        Group Therapy Note  This writer facilitated a psychotherapy group providing education on forgiveness. Each patient was provided with a handout on what forgiveness is and is not provided via therapist aid. Each patient was provided a forgiveness packet in which they had to identify a situation, person, thoughts, behaviors, and feelings around the impactful event. After allowing time for this, this facilitator encouraged patients to discuss their thoughts, feelings, and reflections following the activity.      Attendees: 4       Patient's Goal:  To provide education on foregiveness    Notes: PT was present and participated in the PHP group. Pt volunteered to share their response and listened to the support given to them by their peers. Pt provided insight into her situation that occurred at [facility]. PT provided insight into how she felt neglected by the staff members and workers. Pt provided insight that this activity was hard to complete for her, but she was glad that she was able to get through it. PT provided active listening for peers as they shared their responses to the question.     Status After Intervention:  Improved    Participation Level: Active Listener and Interactive    Participation Quality: Appropriate, Sharing, and Supportive      Speech:  normal      Thought Process/Content: Logical      Affective Functioning: Congruent      Mood: calm      Level of consciousness:  Alert and Oriented x4      Response to Learning: Able to verbalize current knowledge/experience, Able to verbalize/acknowledge new learning, Able to retain information, Capable of insight, and Progressing to goal      Endings: None Reported    Modes of Intervention: Education, Support, and

## 2024-07-09 NOTE — GROUP NOTE
Group Therapy Note    Date: 7/9/2024    Group Start Time:  2:00 PM  Group End Time:  2:45 PM  Group Topic: Wrap-Up    [facility]    Kait Crenshaw MSW        Group Therapy Note  This writer facilitated a wrap-up group by encouraging patients to reflect on their drawings. Each patient was asked to describe how they were feeling in the moment. Each patient was asked to complete an outpatient behavioral health wrap-up sheet and lunch menu.    Attendees: 5       Patient's Goal:  To reflect on current emotions and feelings     Notes: PT was present and participated in the PHP group. Pt volunteered to share their response and listened to the support given to them by their peers. PT provided insight into her drawing and how she feels currently. Pt rudy a question katrin to symbolize how she feels during moments of hyperarousal, a dark line to represent her coming down from the hyperarousal, and the beach to represent her being at peace. PT provided active listening for peers as they shared their responses to the question.     Status After Intervention:  Improved    Participation Level: Active Listener and Interactive    Participation Quality: Appropriate, Sharing, and Supportive      Speech:  normal      Thought Process/Content: Logical      Affective Functioning: Congruent      Mood: calm      Level of consciousness:  Alert and Oriented x4      Response to Learning: Able to verbalize current knowledge/experience, Able to verbalize/acknowledge new learning, Able to retain information, Capable of insight, and Progressing to goal      Endings: None Reported    Modes of Intervention: Support, Socialization, and Exploration      Discipline Responsible: /Counselor      Signature:  DANIEL Miller

## 2024-07-09 NOTE — GROUP NOTE
Group Therapy Note    Date: 7/9/2024    Group Start Time: 10:40 AM  Group End Time: 11:30 AM  Group Topic: Cognitive Skills    [facility] Jorge Lr MSW        Group Therapy Note    Writer facilitated group and started with requesting patients to complete wellness assessment and reflect on observations. Patients were introduced to the DBT skill: PLEASE and provided a worksheet outlining the definitions along with examples for each factor. Patients were asked to reflect on their current habits and identify areas for improvement. Writer was unable to complete discussion due to time and informed patients that it will be continued in another group.        Attendees: 4    Patient's Goal: to improve wellbeing, to identify coping skills       Notes:  Patient completed assessment but was unable to share answers. Patient was pulled for an individual session with Kait Crenshaw for most of group.    Status After Intervention:  Unchanged    Participation Level: Active Listener    Participation Quality: Appropriate      Speech:  normal      Thought Process/Content: Logical      Affective Functioning: Congruent      Mood: anxious      Level of consciousness:  Alert      Response to Learning: Progressing to goal      Endings: None Reported    Modes of Intervention: Education and Support      Discipline Responsible: /Counselor      Signature:  DANIEL Mccarty

## 2024-07-09 NOTE — GROUP NOTE
Group Therapy Note    Date: 7/9/2024    Group Start Time:  1:10 PM  Group End Time:  2:00 PM  Group Topic: Psychotherapy    [facility]    Jorge Clifton MSW        Group Therapy Note    Writer facilitated group and requested patients to pull out sheet from group three to continue work on improving wellness habit. One patient requested to process last group due to it being heavy and other patients also agreed. Writer facilitated a shake out meditation before processing. Patients processed and related to one another about panic attacks and forgiveness. Writer also encouraged patients to come up with a list of grounding techniques to use when in distress. To conclude, writer encouraged patients to draw how they were feeling to help regulate.         Attendees: 5        Patient's Goal: to process emotions, to identify coping skills       Notes:  Patient appeared tearful and processed feeling overwhelmed and fearing potential panic attack. Patient processed feeling out of her body and being in an off mood since the morning. Patient was receptive to feedback and support.    Status After Intervention:  Improved    Participation Level: Active Listener    Participation Quality: Appropriate      Speech:  normal      Thought Process/Content: Logical      Affective Functioning: Congruent      Mood: anxious      Level of consciousness:  Alert and Oriented x4      Response to Learning: Capable of insight and Progressing to goal      Endings: None Reported    Modes of Intervention: Education and Support      Discipline Responsible: /Counselor      Signature:  DANIEL Mccarty

## 2024-07-09 NOTE — GROUP NOTE
Group Therapy Note    Date: 7/9/2024    Group Start Time:  9:00 AM  Group End Time:  9:40 AM  Group Topic: Community Meeting    [facility] Jorge Lr MSW        Group Therapy Note    Writer facilitated group and started with requesting patients to complete check in sheets to assess safety and mood. Patients were asked to identify an intention for the day and jot it on the board. To conclude, patients were asked to write a letter to themselves to open on last day of treatment. Patients completed letter and read them out loud.        Attendees: 5        Patient's Goal: to assess safety, to identify goals       Notes:  Patient appeared tearful when peers presented her with a birthday card. Patient shared not expecting to be her on her bday and having mixed feelings. Patient denied SI/HI/MEHREEN on check in sheet and stated intention of the day was to to reduce anxiety to a manageable level. Patient completed letter activity and wrote to herself.    Status After Intervention:  Improved    Participation Level: Active Listener    Participation Quality: Appropriate      Speech:  normal      Thought Process/Content: Logical      Affective Functioning: Congruent      Mood: anxious      Level of consciousness:  Alert and Oriented x4      Response to Learning: Capable of insight and Progressing to goal      Endings: None Reported    Modes of Intervention: Support, Socialization, and Exploration      Discipline Responsible: /Counselor      Signature:  DANIEL Mccarty

## 2024-07-09 NOTE — BH NOTE
Partial Hospitalization Program Individual Psychotherapy Note      Diagnosis: F41.9: Anxiety, unspecified     Goal: This writer met with the PT to review and collaborate to provide updates to her treatment plan goals. This writer conducted an individual session to inquire about PT progress in groups and provide updates, if needed.         Psychotherapy Session    Start time: 11:00  Stop time: 11:30        Patient Mental Status and Mood/Affect:Anxious, Calm, Congruent, and Happy    Patient Behavior and Appearance: Cooperative and Good eye contactshows no evidence of impairment    Intervention/Techniques: Informed, Validated/Supported, Reflected, Guided, Challenged, Reframed, Prompted/Cued, Listened/Empathized, Observed/Monitored, Queired/Probed, Clarified, Reinforced, Facilitated Disclosure, and Provided Feedback    Focus of Session/Patient Response and Progress Towards Goal: Pt was engaged in session as evidence of her ability to reflect on her symptoms and identify areas of need.     Narrative: This writer met with the patient to review and update his treatment plan. This writer discussed the current treatment goals and objectives with the patient and added in new progress made towards each goal. This writer began the session with a check-in to gain insight into the patient's current mood and mental status. The PT informed this writer that she was in a good mood due to today being her birthday. The PT informed this writer that she has been mentally stable and has not had any panic attacks recently. This writer inquired about the patient's perception of groups and whether she feels that her concerns are being addressed within group content. The PT informed this writer that she has been enjoying groups and the content so far. The PT informed this writer that she has been able to identify her triggers and learn new coping skills that she can use to manage her reactions to her triggers. This writer provided verbal

## 2024-07-09 NOTE — GROUP NOTE
Group Therapy Note    Date: 7/9/2024    Group Start Time:  9:40 AM  Group End Time: 10:30 AM  Group Topic: Process Group - Outpatient    [facility]    Kait Crenshaw MSW        Group Therapy Note  This writer facilitated a process group by encouraging the patients to share any stressors or triggers that have recently been impacting them. This writer encouraged the patients to support one another and offer feedback to each other when appropriate.  After allowing time for this, this facilitator encouraged patients to discuss their thoughts, feelings, and reflections following the activity.      Attendees: 5       Patient's Goal:  To identify current stressors and provide support     Notes:  Pt was present and participated in the PHP group. Pt volunteered to share their response and listened to the support given to them by their peers. Pt expressed that today is her birthday and provided insight about becoming a year older. PT provided active listening for peers as they shared their responses to the question.     Status After Intervention:  Improved    Participation Level: Active Listener and Interactive    Participation Quality: Appropriate, Sharing, and Supportive      Speech:  normal      Thought Process/Content: Logical      Affective Functioning: Congruent      Mood: calm      Level of consciousness:  Alert and Oriented x4      Response to Learning: Able to verbalize current knowledge/experience, Able to verbalize/acknowledge new learning, Able to retain information, and Capable of insight      Endings: None Reported    Modes of Intervention: Support, Socialization, and Exploration      Discipline Responsible: /Counselor      Signature:  DANIEL Miller

## 2024-07-10 ENCOUNTER — HOSPITAL ENCOUNTER (OUTPATIENT)
Facility: HOSPITAL | Age: 34
Discharge: HOME | End: 2024-07-13
Payer: COMMERCIAL

## 2024-07-10 VITALS
OXYGEN SATURATION: 100 % | HEART RATE: 79 BPM | TEMPERATURE: 98.4 F | DIASTOLIC BLOOD PRESSURE: 71 MMHG | SYSTOLIC BLOOD PRESSURE: 118 MMHG

## 2024-07-10 PROCEDURE — 90853 GROUP PSYCHOTHERAPY: CPT

## 2024-07-10 NOTE — GROUP NOTE
Group Therapy Note    Date: 7/10/2024    Group Start Time: 10:40 AM  Group End Time: 11:30 AM  Group Topic: Cognitive Skills    [facility] Sin Ledbetter LCSW        Group Therapy Note    This writer facilitated the cognitive skills group. The patients were encouraged to participate in an ice breaker question to get to know this facilitator and for this facilitator to get to know the patients. This writer facilitated a conversation on forgiveness and issues with a past worksheet on forgiveness. The patient ended group with a \"shake it out exercise and another ice breaker\"    Attendees: 5       Patient's Goal:  Participate in discussion on forgiveness       Notes: The patient participated in the ice breaker exercise. The patient participated in the discussion on forgiveness. The patient shared her experience trying to understand those she has forgiven and that being helpful.  The patient The patient will continue to participate in discussions during the cognitive skill group.      Status After Intervention:  Unchanged    Participation Level: Active Listener and Interactive    Participation Quality: Appropriate, Attentive, Sharing, and Supportive      Speech:  normal      Thought Process/Content: Logical      Affective Functioning: Congruent      Mood: euthymic      Level of consciousness:  Alert, Oriented x4, and Attentive      Response to Learning: Able to verbalize current knowledge/experience, Capable of insight, and Progressing to goal      Endings: None Reported    Modes of Intervention: Support, Socialization, and Exploration      Discipline Responsible: /Counselor      Signature:  Sin Jacobs LCSW

## 2024-07-10 NOTE — GROUP NOTE
Group Therapy Note    Date: 7/10/2024    Group Start Time:  2:00 PM  Group End Time:  2:45 PM  Group Topic: Wrap-Up    [facility]    Sin Jacobs LCSW        Group Therapy Note    This writer facilitated the wrap up group. The patients were encouraged to complete the afternoon assessment. The patients were encouraged to complete an emotion identification exercise to discuss where in their bodies they feel emotions.     Attendees: 6       Patient's Goal: Identify mood and reflect on emotions.        Notes: The patient completed the afternoon assessment and denied SI/HI. The patient completed the emotion identification exercise. The patient requested to speak with a  to pray. The patient will continue to identify emotions and reflect on where emotions are felt in their bodies.      Status After Intervention:  Unchanged    Participation Level: Active Listener and Interactive    Participation Quality: Appropriate, Attentive, Sharing, and Supportive      Speech:  normal      Thought Process/Content: Logical      Affective Functioning: Congruent      Mood: euthymic      Level of consciousness:  Alert, Oriented x4, and Attentive      Response to Learning: Able to verbalize current knowledge/experience, Capable of insight, and Progressing to goal      Endings: None Reported    Modes of Intervention: Support, Socialization, Exploration, and Activity      Discipline Responsible: /Counselor      Signature:  Sin Jacobs LCSW

## 2024-07-10 NOTE — GROUP NOTE
Group Therapy Note    Date: 7/10/2024    Group Start Time:  1:10 PM  Group End Time:  2:00 PM  Group Topic: Process Group - Outpatient    [facility]    Sin Jacobs LCSW        Group Therapy Note    This writer observed while group was facilitated by Spring BLANCAS    Attendees: 6       Patient's Goal: be attentive to  presentation        Notes: The patient was attentive to  presentation. The patient participated in a discussion on building support. The patient discussed creating a support system with the . The patient will continue to be attentive to  presentation.      Status After Intervention:  Unchanged    Participation Level: Active Listener and Interactive    Participation Quality: Appropriate      Speech:  normal      Thought Process/Content: Logical      Affective Functioning: Congruent      Mood: euthymic      Level of consciousness:  Alert, Oriented x4, and Attentive      Response to Learning: Able to verbalize current knowledge/experience, Capable of insight, and Progressing to goal      Endings: None Reported    Modes of Intervention: Education      Discipline Responsible: /Counselor      Signature:  Sin Jacobs LCSW

## 2024-07-10 NOTE — GROUP NOTE
Group Therapy Note    Date: 7/10/2024    Group Start Time:  9:40 AM  Group End Time: 10:30 AM  Group Topic: Process Group - Outpatient    [facility]    Jorge Clifton MSW        Group Therapy Note    Writer facilitated group and started with encouraging patients to process a situation that they may needed feedback on or just need to vent.  Patients requested to sit outside to process. Patients processed peer's struggle with agoraphobia and safety zones as well as the impact finances can have on relationships.       Attendees: 5        Patient's Goal: to process emotions, to engage with peers       Notes:  Patient provided feedback to peer struggling with fear around relationships. Patient shared her and bf both are not materialistic but they still work for living cost. Patient reflected on both things being true at the same time.    Status After Intervention:  Improved    Participation Level: Active Listener    Participation Quality: Appropriate      Speech:  normal      Thought Process/Content: Logical      Affective Functioning: Congruent      Mood: anxious      Level of consciousness:  Alert and Oriented x4      Response to Learning: Capable of insight and Progressing to goal      Endings: None Reported    Modes of Intervention: Support, Socialization, and Exploration      Discipline Responsible: /Counselor      Signature:  DANIEL Mccarty

## 2024-07-10 NOTE — GROUP NOTE
Group Therapy Note    Date: 7/10/2024    Group Start Time:  9:00 AM  Group End Time:  9:40 AM  Group Topic: Community Meeting    [facility]    Jorge Clifton MSW        Group Therapy Note    Writer facilitated group and started with requesting patients to complete check in sheets to assess safety and mood. Patients were asked to identify an intention for the day and jot it on the board. To conclude, patients were asked to complete a worksheet on self-esteem and reflect on the answers with peers. Patients did not finish sharing and were encouraged to do so in the next group.      Attendees: 6      Patient's Goal: to assess safety, to identify goal, to improve self esteem       Notes:  Pt denied SI/HI/MEHREEN on check in sheet and shared goal of the day was to diffuse a spiral. Patient reported the worksheet was helpful as a reminder that she is more than just her anxiety. Patient shared having a hard time spiraling after PHP. Pt was receptive to feedback.    Status After Intervention:  Improved    Participation Level: Active Listener    Participation Quality: Appropriate      Speech:  normal      Thought Process/Content: Logical      Affective Functioning: Congruent      Mood: anxious      Level of consciousness:  Alert and Oriented x4      Response to Learning: Capable of insight and Progressing to goal      Endings: None Reported    Modes of Intervention: Support, Socialization, and Exploration      Discipline Responsible: /Counselor      Signature:  DANIEL Mccarty

## 2024-07-11 ENCOUNTER — HOSPITAL ENCOUNTER (OUTPATIENT)
Facility: HOSPITAL | Age: 34
Discharge: HOME | End: 2024-07-14
Payer: COMMERCIAL

## 2024-07-11 VITALS
OXYGEN SATURATION: 99 % | TEMPERATURE: 98.1 F | DIASTOLIC BLOOD PRESSURE: 77 MMHG | SYSTOLIC BLOOD PRESSURE: 115 MMHG | HEART RATE: 83 BPM

## 2024-07-11 PROCEDURE — 90853 GROUP PSYCHOTHERAPY: CPT

## 2024-07-11 NOTE — GROUP NOTE
Group Therapy Note    Date: 7/11/2024    Group Start Time:  1:10 PM  Group End Time:  2:00 PM  Group Topic: Psychotherapy    [facility]    Kait Crenshaw MSW        Group Therapy Note  This writer facilitated a psychoeducational group focusing on relationships and communication. This writer showed a video clip highlighting the different types of relationships and communication styles. After allowing time for this, the facilitator encouraged patients to discuss their thoughts, feelings, and reflections following the clip.     Attendees: 5       Patient's Goal: To identify different relationship and communication styles    Notes: Pt was present and participated in group. Pt watched the video clip. Pt provided insight into how she could relate to the couple in the video clip. Pt provided insight into her communication styles in past relationships.     Status After Intervention:  Improved    Participation Level: Active Listener and Interactive    Participation Quality: Appropriate, Sharing, and Supportive      Speech:  normal      Thought Process/Content: Logical      Affective Functioning: Congruent      Mood: calm      Level of consciousness:  Alert and Oriented x4      Response to Learning: Able to verbalize current knowledge/experience, Able to verbalize/acknowledge new learning, Able to retain information, Capable of insight, and Progressing to goal      Endings: None Reported    Modes of Intervention: Education, Socialization, and Exploration      Discipline Responsible: /Counselor      Signature:  DANIEL Miller    
                                                                      Group Therapy Note    Date: 7/11/2024    Group Start Time:  2:00 PM  Group End Time:  2:45 PM  Group Topic: Wrap-Up    [facility] Jorge Lr MSW        Group Therapy Note    Writer facilitated group and started with requesting patients to complete wrap up sheets. Patients were asked to identify a key takeaway and share it with peers. To conclude, patients were given a coping skill  origami activity where they had to identify 8 skills to put on it.         Attendees: 4        Patient's Goal: to identify coping skills, to assess safety       Notes:  Pt denied SI/HI/MEHREEN on check in sheet and expressed gratitude for peers listening to her fear of medications. Patient stated her takeaway was learning more than talking about emotions can help diffuse them. Pt participated in activity and will continue to work towards goal.    Status After Intervention:  Improved     Participation Level: Active Listener    Participation Quality: Appropriate      Speech:  normal      Thought Process/Content: Logical      Affective Functioning: Congruent      Mood: anxious      Level of consciousness:  Alert and Oriented x4      Response to Learning: Capable of insight and Progressing to goal      Endings: None Reported    Modes of Intervention: Support, Socialization, and Exploration      Discipline Responsible: /Counselor      Signature:  DANIEL Mccarty    
                                                                      Group Therapy Note    Date: 7/11/2024    Group Start Time: 10:40 AM  Group End Time: 11:30 AM  Group Topic: Psychotherapy    [facility] Kait Love MSW        Group Therapy Note  This writer facilitated a cognitive skills group focused on identifying their trauma. Each patient was provided a handout with instructions to identify experiences, events, etc. that have happened throughout their lives. Each patient was provided with materials to bring their trauma tree to life. After allowing time for this, the facilitator encouraged patients to discuss their thoughts, feelings, and reflections following the activity.    Attendees: 5       Patient's Goal:  To identify traumatic events or experinces    Notes: PT was present and participated in the group. Pt completed her handout and the following prompts on the handout. PT completed her handout and the following prompts on the handout.  began working on her trauma tree. Pt asked her peers for help witsuggestionshe prompts on the sheet. Pt was receptive to her peers suggestion.      Status After Intervention:  Improved    Participation Level: Active Listener and Interactive    Participation Quality: Appropriate, Sharing, and Supportive      Speech:  normal      Thought Process/Content: Logical      Affective Functioning: Congruent      Mood: calm      Level of consciousness:  Alert and Oriented x4      Response to Learning: Able to verbalize current knowledge/experience, Able to verbalize/acknowledge new learning, Able to retain information, and Capable of insight      Endings: None Reported    Modes of Intervention: Education, Socialization, and Exploration      Discipline Responsible: /Counselor      Signature:  DANIEL Miller    
                                                                      Group Therapy Note    Date: 7/11/2024    Group Start Time: 12:00 PM  Group End Time:  1:00 PM  Group Topic: Psychotherapy    [facility]    Jorge Clifton MSW        Group Therapy Note    Patients did not get the chance to process in group two due to activity. Patients requested for this group to include time to process emotions they need advice on. Patients supported peers struggling with fear around discharge, upcoming knee surgery, and anxiety around medications.        Attendees: 5        Patient's Goal: to process emotions, to engage with peers       Notes:  Patient processed fear around taking medications when she is already having a headache because of period. Patient shared in the past she had no problem but now that she is more informed it scares her. Patient was receptive to challenging and support.    Status After Intervention:  Improved    Participation Level: Active Listener    Participation Quality: Appropriate      Speech:  normal      Thought Process/Content: Logical      Affective Functioning: Congruent      Mood: anxious      Level of consciousness:  Alert and Oriented x4      Response to Learning: Capable of insight and Progressing to goal      Endings: None Reported    Modes of Intervention: Support, Socialization, and Exploration      Discipline Responsible: /Counselor      Signature:  DANIEL Mccarty    
insight      Endings: None Reported    Modes of Intervention: Support, Socialization, and Exploration      Discipline Responsible: /Counselor      Signature:  DANIEL Miller

## 2024-07-11 NOTE — BH NOTE
OUTPATIENT PHYSICIAN PROGRESS NOTE      Chief Complaint/Symptoms/Impairments (as noted in Treatment Plan): Janiya Geronimo 34 yr old female admitted to [facility] after having increased panic attacks daily.  Patient has been continuing to fail outpatient management of her anxiety.  Patient has been experiencing higher levels of anxiety and increased worry after her wisdom tooth removal.  She has been having increased panic attacks unable to function.  She has visited to the emergency department.  She expresses just getting her medications has not been as helpful and needing more help with managing her panic attacks.  She expresses poor sleep heaviness in her chest heart palpitations increased crying spells increased fear or increased nausea or vomiting fear of losing control.  She has not been herself for the past several years ago which she had some nausea.  Weeks and afraid that she can never get better.    Patient expresses that she had a difficult experience with her wisdom tooth removal and that she was totally aware of what was happening around her and that upset that she was not fully sedated.  Today she expresses taking her Klonopin 1 mg p.o. 3 times daily that was prescribed to her by her outpatient psychiatrist.  She is also on propranolol as needed    She has stopped taking Celexa which has helped her in the past but had some nausea/vomiting for a long time.    Mental status examination-patient is alert and oriented to name place person.  Presents extremely anxious but reports her panic attacks has been somewhat better but still fears every day of another panic attack.  She expresses she has not been able to function and do what she used to do few weeks months ago.    Criteria for Continued Treatment (check all that apply):   [x] Preventing Decompensation   [x] Improving Level of Functioning  [x] Reducing Isolative Behaviors  [x] Understanding Diagnosis and Need for Medications  [x] Improving Treatment/Medication

## 2024-07-12 ENCOUNTER — HOSPITAL ENCOUNTER (OUTPATIENT)
Facility: HOSPITAL | Age: 34
Discharge: HOME | End: 2024-07-15
Payer: COMMERCIAL

## 2024-07-12 VITALS
HEART RATE: 86 BPM | DIASTOLIC BLOOD PRESSURE: 74 MMHG | SYSTOLIC BLOOD PRESSURE: 109 MMHG | OXYGEN SATURATION: 99 % | TEMPERATURE: 98 F

## 2024-07-12 PROCEDURE — 90853 GROUP PSYCHOTHERAPY: CPT

## 2024-07-12 NOTE — GROUP NOTE
Group Therapy Note    Date: 7/12/2024    Group Start Time:  1:10 PM  Group End Time:  2:00 PM  Group Topic: Cognitive Skills    [facility]    Jorge Clifton MSW        Group Therapy Note      Writer facilitated group and continued discussion from group three. Patients were asked to jot one inner critical thought on a piece of paper and drop it in a bowl. Writer read out loud each sentence and requested peers to help reframe. Following activity, patients were provided with instructions to create their own super hero along with a story for it. Patients did not complete activity and were asked to continue into the next group.        Attendees: 6      Patient's Goal: to reframe cognitive distortions, to express self through creative activities           Notes:  Pt identified similarities between all peers in relation to self-critical thoughts. Pt encouraged peer to think about all the people they have not met yet. Patient participated in both activities and was agreeable to continue drawing super hero in next group.    Status After Intervention:  Improved    Participation Level: Active Listener    Participation Quality: Appropriate      Speech:  normal      Thought Process/Content: Logical      Affective Functioning: Congruent      Mood: anxious      Level of consciousness:  Alert and Oriented x4      Response to Learning: Capable of insight and Progressing to goal      Endings: None Reported    Modes of Intervention: Education, Support, and Exploration      Discipline Responsible: /Counselor      Signature:  DANIEL Mccarty    
                                                                      Group Therapy Note    Date: 7/12/2024    Group Start Time:  2:00 PM  Group End Time:  2:45 PM  Group Topic: Wrap-Up    [facility]    Kait Crenshaw MSW        Group Therapy Note  This writer facilitated a wrap-up group by encouraging each patient to draw themselves as a superhero. Each patient was encouraged to identify superpowers, design a costume, and develop qualities. Each patient was encouraged to develop a backstory for their superhero and how they have managed to overcome obstacles. Each patient received an outpatient behavioral check-out sheet and a lunch menu to complete. Each patient received a copy of their safety plan for use over the weekend, if needed. After allowing time for this, the facilitator encouraged patients to discuss their thoughts, feelings, and reflections following the activity.       Attendees: 6       Patient's Goal:  To create develop your own superhero    Notes: Pt was present and participated in the PHP group. Pt completed drawing of her superhero. Pt expressed that the name of her superhero is \"Flex\". Pt expressed that her superhero power would be to create instance peace over herself and others. Pt expressed that her superhero [place].     Status After Intervention:  Improved    Participation Level: Active Listener and Interactive    Participation Quality: Appropriate, Sharing, and Supportive      Speech:  normal      Thought Process/Content: Logical      Affective Functioning: Congruent      Mood: calm      Level of consciousness:  Alert and Oriented x4      Response to Learning: Able to verbalize current knowledge/experience, Able to verbalize/acknowledge new learning, Able to retain information, and Capable of insight      Endings: None Reported    Modes of Intervention: Support, Socialization, and Exploration      Discipline Responsible: /Counselor      Signature:  Kait 
                                                                      Group Therapy Note    Date: 7/12/2024    Group Start Time:  9:00 AM  Group End Time:  9:40 AM  Group Topic: Community Meeting    [facility]    Kait Crenshaw MSW        Group Therapy Note  This writer facilitated a community group focusing on identifying what they have learned in PHP treatment so far. Each patient was provided with a handout containing three questions for them to answer. This facilitator provided each patient with an outpatient check-in sheet and a lunch menu to complete. After allowing time for this, the facilitator encouraged patients to discuss their thoughts, feelings, and reflections following the activity.    Attendees: 6       Patient's Goal:  To identify areas of growth in PHP treatment    Notes:  PT was present and participated in the PHP group. Pt volunteered to share their response and listened to the support given to them by their peers. PT expressed that she has learned how to use her coping skills to manage spikes in her anxiety. Pt expressed that she was surprised at her ability to stay in the group space and resist the urge to leave. PT provided active listening for peers as they shared their responses to the question.     Status After Intervention:  Improved    Participation Level: Active Listener and Interactive    Participation Quality: Appropriate, Sharing, and Supportive      Speech:  normal      Thought Process/Content: Logical      Affective Functioning: Congruent      Mood: calm      Level of consciousness:  Alert and Oriented x4      Response to Learning: Able to verbalize current knowledge/experience, Able to verbalize/acknowledge new learning, Able to retain information, and Capable of insight      Endings: None Reported    Modes of Intervention: Support, Socialization, and Exploration      Discipline Responsible: /Counselor      Signature:  DANIEL Miller    
                                                                      Group Therapy Note    Date: 7/12/2024    Group Start Time:  9:40 AM  Group End Time: 10:30 AM  Group Topic: Process Group - Outpatient    [facility]    Kait Crenshaw MSW        Group Therapy Note  This writer facilitated a process group by encouraging the patients to use guided imagery. Each patient received the \"Three Magic Doors\" handout, asking them to identify areas of opportunity, change, and people who have helped them in their mental health journey. After allowing time for this, the facilitator encouraged patients to discuss their thoughts, feelings, and reflections following the activity.    Attendees: 5       Patient's Goal:  To encourage the use of guided imagery    Notes: PT was present and participated in the PHP group. Pt volunteered to share their response and listened to the support given to them by their peers. 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 Pt identified her areas of change as trying a new medication. PT provided active listening for peers as they shared their responses to the question.     Status After Intervention:  Improved    Participation Level: Active Listener and Interactive    Participation Quality: Appropriate, Sharing, and Supportive      Speech:  normal      Thought Process/Content: Logical      Affective Functioning: Congruent      Mood: calm      Level of consciousness:  Alert and Oriented x4      Response to Learning: Able to verbalize current knowledge/experience, Able to verbalize/acknowledge new learning, Able to retain information, and Capable of insight      Endings: None Reported    Modes of Intervention: Support, Socialization, and Exploration      Discipline Responsible: /Counselor      Signature:  DANIEL Miller    
                                                                      Group Therapy Note    Date: 7/12/2024    Group Start Time: 10:40 AM  Group End Time: 11:30 AM  Group Topic: Cognitive Skills    [facility] Jorge Lr MSW        Group Therapy Note    Writer facilitated group and started with asking patients to share an object they brought for \" emotion show and tell\" activity. Following activity, patients were given a worksheet on the inner critic and asked to reflect on how it forms and operates within themselves. Patients were asked to identify various sentences heard from their inner critic and encouraged to reframe it with inner . Pts did not complete activity and were encouraged to do so in another group.      Attendees: 7        Patient's Goal: to identify thought distortions, to improve self compassion, to reframe distorions       Notes:  Patient shared a picture of younger self as an object that she connects with nicholas and hope. Patient followed along with content and reflected on inner critic making her feel like an imposter in all areas of life. Pt was seen completing worksheet and will continue to be encouraged towards goal.    Status After Intervention:  Improved    Participation Level: Active Listener    Participation Quality: Appropriate      Speech:  normal      Thought Process/Content: Logical      Affective Functioning: Congruent      Mood: anxious      Level of consciousness:  Alert and Oriented x4      Response to Learning: Capable of insight and Progressing to goal      Endings: None Reported    Modes of Intervention: Education and Support      Discipline Responsible: /Counselor      Signature:  DANIEL Mccarty    
DANIEL Crenshaw

## 2024-07-12 NOTE — PROGRESS NOTES
Spiritual Care Assessment/Progress Note  [facility]    Name: Janiya Geronimo MRN: [id number]    Age: 34 y.o.     Sex: female   Language: English     Date: 7/12/2024            Total Time Calculated: 40 min              Spiritual Assessment begun in [facility]  Service Provided For: Patient  Referral/Consult From: Nurse  Encounter Overview/Reason: Initial Encounter    Spiritual beliefs:      [x] Involved in a cyndy tradition/spiritual practice: Jehovah's witness     [] Supported by a cyndy community:      [] Claims no spiritual orientation:      [] Seeking spiritual identity:           [] Adheres to an individual form of spirituality:      [] Not able to assess:                Identified resources for coping and support system:           [x] Prayer                  [] Devotional reading               [] Music                  [] Guided Imagery     [] Pet visits                                        [] Other: (COMMENT)     Specific area/focus of visit   Encounter:    Crisis:    Spiritual/Emotional needs: Type: Spiritual Support, Emotional Distress  Ritual, Rites and Sacraments:    Grief, Loss, and Adjustments:    Ethics/Mediation:    Behavioral Health:    Palliative Care:    Advance Care Planning:      Plan/Referrals: Other (Comment) ( available upon request.)    Narrative: MARIJA Lubin  Intern responded to call to see client. Client is in class with other client's. Client shared Life/review/legacy including family, medical and that she is Jehovah's witness. Client tearful stating she had experienced a poor outcome for dental purposes less than a month ago and has caused some conflict in her spiritual and emotional state. Client vented her support system is prayer and belief in God. Listened empathically, providing time and space for reflection and sharing of life's sacred events. Provided words of comfort and encouragement. Provided fervent prayer at client's request. Maintained sustaining presence of

## 2024-07-15 ENCOUNTER — HOSPITAL ENCOUNTER (OUTPATIENT)
Facility: HOSPITAL | Age: 34
Discharge: HOME | End: 2024-07-18
Payer: COMMERCIAL

## 2024-07-15 VITALS
OXYGEN SATURATION: 100 % | SYSTOLIC BLOOD PRESSURE: 110 MMHG | HEART RATE: 91 BPM | TEMPERATURE: 97.8 F | DIASTOLIC BLOOD PRESSURE: 76 MMHG

## 2024-07-15 PROCEDURE — 90853 GROUP PSYCHOTHERAPY: CPT

## 2024-07-15 NOTE — BH NOTE
OUTPATIENT PHYSICIAN PROGRESS NOTE      Chief Complaint/Symptoms/Impairments (as noted in Treatment Plan): Janiya Geronimo 34 yr old female admitted to [facility] after having increased panic attacks daily.  Patient has been continuing to fail outpatient management of her anxiety.  Patient has been experiencing higher levels of anxiety and increased worry after her wisdom tooth removal.  She has been having increased panic attacks unable to function.  She has visited to the emergency department.  She expresses just getting her medications has not been as helpful and needing more help with managing her panic attacks.  She expresses poor sleep heaviness in her chest heart palpitations increased crying spells increased fear or increased nausea or vomiting fear of losing control.  She has not been herself for the past several years ago which she had some nausea.  Weeks and afraid that she can never get better.    Patient expresses that she had a difficult experience with her wisdom tooth removal and that she was totally aware of what was happening around her and that upset that she was not fully sedated.  Today she expresses taking her Klonopin 1 mg p.o. 3 times daily that was prescribed to her by her outpatient psychiatrist.  She is also on propranolol as needed    She has stopped taking Celexa which has helped her in the past but had some nausea/vomiting for a long time.    7/15/2024-patient was seen this morning.  She has been anxious of taking her medications that was prescribed last week.  She states she started her Lexapro 2.5 mg daily.  She expresses that it made her feel tired from most few hours.  She is anxious about having nausea and vomiting.  Patient was encouraged to continue to take medications after p.o. intake before breakfast or meals.  She was also explored if she wants to move the medication to the bedtime.  Patient hear the voice noted to have been actively engaging in therapy sessions.  Continues to present

## 2024-07-15 NOTE — GROUP NOTE
Group Therapy Note    Date: 7/15/2024    Group Start Time:  9:40 AM  Group End Time: 10:30 AM  Group Topic: Process Group - Outpatient    [facility]    Jorge Clifton MSW        Group Therapy Note    Writer facilitated group and started with encouraging patients to process situation they wrote on their worksheets. Patients processed and engaged in a discussion about grief, panic attacks, medications, and social anxiety.         Attendees: 4        Patient's Goal: to process emotions       Notes:  Pt related to peer struggling with social anxiety and being fearful of different environment. Patient processed anxiety around medications and expressed feeling better after venting about it. Pt was receptive to feedback and support.    Status After Intervention:  Improved    Participation Level: Active Listener    Participation Quality: Appropriate      Speech:  normal      Thought Process/Content: Logical      Affective Functioning: Congruent      Mood: anxious      Level of consciousness:  Alert and Oriented x4      Response to Learning: Capable of insight and Progressing to goal      Endings: None Reported    Modes of Intervention: Support, Socialization, and Exploration      Discipline Responsible: /Counselor      Signature:  DANIEL Mccarty

## 2024-07-15 NOTE — GROUP NOTE
Group Therapy Note    Date: 7/15/2024    Group Start Time: 12:00 PM  Group End Time:  1:00 PM  Group Topic: Psychoeducation    Jorge Gamboa MSW        Group Therapy Note    Writer facilitated group and started with projecting a short clip on the window of tolerance. Patients were asked to complete a worksheet identifying their specific symptoms and reflect on it with peers. Patients were encouraged to follow along worksheet packet and highlight various skills they can use to help mitigate certain symptoms such as feeling like body is collapsing. To conclude, writer facilitated three types of grounding activities.         Attendees: 6        Patient's Goal: to learn about the window of tolerance, to identify symptoms, to learn coping skills      Notes:  Patient shared having abnormal heightened responses to situations that she logically knows are safe. Patient expressed struggling with shaking in the past but using ice to help manage it. Pt was seen putting star next to skills to try at home.    Status After Intervention:  Improved    Participation Level: Active Listener    Participation Quality: Appropriate      Speech:  normal      Thought Process/Content: Logical      Affective Functioning: Congruent      Mood: anxious      Level of consciousness:  Alert and Oriented x4      Response to Learning: Able to verbalize/acknowledge new learning, Able to retain information, Capable of insight, and Progressing to goal      Endings: None Reported    Modes of Intervention: Education and Support      Discipline Responsible: /Counselor      Signature:  DANIEL Mccarty

## 2024-07-15 NOTE — GROUP NOTE
Group Therapy Note    Date: 7/15/2024    Group Start Time:  9:00 AM  Group End Time:  9:40 AM  Group Topic: Community Meeting    [facility] Jorge Lr MSW        Group Therapy Note    Writer facilitated group and started with requesting patients to complete check in sheets to assess safety along with mood. Patients were asked to complete a weekend check in worksheet encouraging them to identify coping skills, positive events, and moments they felt overwhelmed from this past weekend. Patient shared their answers and were encouraged to continue processing them in the next group.       Attendees: 5        Patient's Goal: to assess safety, to process emotions, to identify coping skills       Notes:  Pt denied SI/HI/MEHREEN on check in sheet and expressed feeling nervous about taking medications today. Patient shared going out on Friday and Saturday but having a tough time doing so on Sunday.    Status After Intervention:  Improved    Participation Level: Active Listener    Participation Quality: Appropriate      Speech:  normal      Thought Process/Content: Logical      Affective Functioning: Congruent      Mood: anxious      Level of consciousness:  Alert and Oriented x4      Response to Learning: Capable of insight and Progressing to goal      Endings: None Reported    Modes of Intervention: Support, Socialization, and Exploration      Discipline Responsible: /Counselor      Signature:  DANIEL Mccarty

## 2024-07-15 NOTE — GROUP NOTE
Group Therapy Note    Date: 7/15/2024    Group Start Time: 10:40 AM  Group End Time: 11:30 AM  Group Topic: Cognitive Skills    [facility] Kati Love MSW        Group Therapy Note  This writer facilitated a cognitive skills group focusing on the stigma of mental health in media. This writer began a discussion surrounding how social media reflects and shows mental health in the media. This writer provided a visual aid via [employer] to show how mental health is depicted and shown in movies and media.  After allowing time for this, the facilitator encouraged patients to discuss their thoughts, feelings, and reflections following the activity.      Attendees: 5       Patient's Goal: To discuss mental health stigmas in the media    Notes: PT was present and participated in the group. Pt provided insight into how she feels the media displays mental health and mental illness. She expressed her distaste for the negative or abusive portrayal of mental health in the media. Pt expressed that she does not like the stigma that if you have a mental health illness or disorder, you will eventually become \"crazy\" or have an have an inability to manage symptoms.     Status After Intervention:  Improved    Participation Level: Active Listener and Interactive    Participation Quality: Appropriate, Sharing, and Supportive      Speech:  normal      Thought Process/Content: Logical      Affective Functioning: Congruent      Mood: calm      Level of consciousness:  Alert and Oriented x4      Response to Learning: Able to verbalize current knowledge/experience, Able to verbalize/acknowledge new learning, Able to retain information, and Capable of insight      Endings: None Reported    Modes of Intervention: Education, Support, and Socialization      Discipline Responsible: /Counselor      Signature:  DANIEL Miller

## 2024-07-15 NOTE — GROUP NOTE
Group Therapy Note    Date: 7/15/2024    Group Start Time:  2:00 PM  Group End Time:  2:45 PM  Group Topic: Wrap-Up    Jorge Gamboa MSW        Group Therapy Note    Writer facilitated group and started with requesting patients to complete wrap up sheets to assess mood, challenges, and takeaways. Patients were provided with a worksheet asking them to identify music, animals, seasons, food, and etc that represent their mental health. Patients completed activity and shared answers with each other. To conclude, patients were asked to identify one self care activity they will engage in today after groups.         Attendees: 6        Patient's Goal: to identify key takeaway, to assess safety, to engage with peers       Notes:  Patient denied SI/HI/MEHREEN on wrap up sheet and expressed gratitude to peers for helping her process medication anxiety. Patient completed worksheet and was seen drawing how she felt in the moment on the back of it. Pt associates her mental health with the color yellow.    Status After Intervention:  Improved    Participation Level: Active Listener    Participation Quality: Appropriate      Speech:  normal      Thought Process/Content: Logical      Affective Functioning: Congruent      Mood: anxious      Level of consciousness:  Alert and Oriented x4      Response to Learning: Able to verbalize/acknowledge new learning, Capable of insight, and Progressing to goal      Endings: None Reported    Modes of Intervention: Support, Socialization, and Exploration      Discipline Responsible: /Counselor      Signature:  DANIEL Mccarty

## 2024-07-15 NOTE — GROUP NOTE
Group Therapy Note    Date: 7/15/2024    Group Start Time:  1:10 PM  Group End Time:  2:00 PM  Group Topic: Psychoeducation    [facility] Kait Love MSW        Group Therapy Note  This writer facilitated a psychoeducation group focusing on self-doubt and \"what if\" busters. Each patient received a self-doubt mountain handout in which they were prompted to identify a goal and obstacles that could prevent them from reaching it. Each patient receives a \"what if\" buster handout to reframe and review their current thinking patterns and \"what if\" statements. After allowing time for this, the facilitator encouraged patients to discuss their thoughts, feelings, and reflections following the activity.    Attendees: 4       Patient's Goal: To disucss self doubt and what if busters    Notes: PT was present and participated in the group. PT identified her goal as going back to work. Pt identified her obstacles to achieving these goals as panic, anxiety, driving, starting new medications, and feelings of panic. Pt identified her what-if statements as. \"What if my anxiety miminzies?\" \"What if I am able to drive?     Status After Intervention:  Improved    Participation Level: Active Listener and Interactive    Participation Quality: Appropriate, Sharing, and Supportive      Speech:  normal      Thought Process/Content: Logical      Affective Functioning: Congruent      Mood: calm      Level of consciousness:  Alert and Oriented x4      Response to Learning: Able to verbalize current knowledge/experience, Able to verbalize/acknowledge new learning, Able to retain information, and Capable of insight      Endings: None Reported    Modes of Intervention: Education, Support, and Socialization      Discipline Responsible: /Counselor      Signature:  DANIEL Miller

## 2024-07-16 ENCOUNTER — HOSPITAL ENCOUNTER (OUTPATIENT)
Facility: HOSPITAL | Age: 34
Discharge: HOME | End: 2024-07-19
Payer: COMMERCIAL

## 2024-07-16 VITALS
SYSTOLIC BLOOD PRESSURE: 117 MMHG | TEMPERATURE: 97.7 F | OXYGEN SATURATION: 100 % | DIASTOLIC BLOOD PRESSURE: 74 MMHG | HEART RATE: 78 BPM

## 2024-07-16 PROCEDURE — 90853 GROUP PSYCHOTHERAPY: CPT

## 2024-07-16 PROCEDURE — 90832 PSYTX W PT 30 MINUTES: CPT

## 2024-07-16 NOTE — GROUP NOTE
Group Therapy Note    Date: 7/16/2024    Group Start Time:  9:40 AM  Group End Time: 10:30 AM  Group Topic: Process Group - Outpatient    [facility]    Kait Crenshaw MSW        Group Therapy Note  This writer facilitated a process group using journal prompts about family traditions and values. Each patient was asked to identify qualities that their family currently displays or ones they wish they had. After allowing time for this, each patient received space to provide a reflection based on the activity.     Attendees: 5       Patient's Goal: To answer journal prompt and reflect on answer    Notes: PT was present and participated in the group. Pt reflected on her answer based on the journal prompt she received. Pt described her family members and how her family units interact with one another. Pt provided insight into how her family is small, but there is a lot of love and support between them. Pt expressed that one defining moment in her family was the death of her father at a young age.     Status After Intervention:  Improved    Participation Level: Active Listener and Interactive    Participation Quality: Appropriate, Sharing, and Supportive      Speech:  normal      Thought Process/Content: Logical      Affective Functioning: Congruent      Mood: calm      Level of consciousness:  Alert and Oriented x4      Response to Learning: Able to verbalize current knowledge/experience, Able to verbalize/acknowledge new learning, Able to retain information, Capable of insight, and Progressing to goal      Endings: None Reported    Modes of Intervention: Support, Socialization, and Exploration      Discipline Responsible: /Counselor      Signature:  DANIEL Miller

## 2024-07-16 NOTE — GROUP NOTE
Group Therapy Note    Date: 7/16/2024    Group Start Time:  9:00 AM  Group End Time:  9:40 AM  Group Topic: Community Meeting    [facility] Jorge Lr MSW        Group Therapy Note    Writer facilitated group and started with requesting patients to complete check in sheets to assess safety and mood. Patients were asked to write an intention on the board and share it with peers. To conclude, patients were asked to complete an emotion exploration worksheet asking them to identify current emotion's sensations, expression, and associations. Patients completed worksheet but did not get time to share with each other.      Attendees: 6        Patient's Goal: to process emotions, to assess safety, to identify goals      Notes:  Pt denied SI/HI/MEHREEN on check in sheet and shared goal of the day was to not fight her feelings but to lean into them. Patient shared feeling better than yesterday but still anxious. Patient was seen completing worksheet and will continue to work towards goal.    Status After Intervention:  Improved    Participation Level: Active Listener    Participation Quality: Appropriate      Speech:  normal      Thought Process/Content: Logical      Affective Functioning: Congruent      Mood: anxious      Level of consciousness:  Alert and Oriented x4      Response to Learning: Capable of insight and Progressing to goal      Endings: None Reported    Modes of Intervention: Support, Socialization, and Exploration      Discipline Responsible: /Counselor      Signature:  DANIEL Mccarty

## 2024-07-16 NOTE — GROUP NOTE
Group Therapy Note    Date: 7/16/2024    Group Start Time: 10:40 AM  Group End Time: 11:30 AM  Group Topic: Cognitive Skills    [facility] Jorge Lr MSW        Group Therapy Note    Writer facilitated group and started with introducing imposter phenomenon as the group topic. Patient requested to process feeling uncomfortable with medications and another patient requested to process feeling triggered by another peer's relationship. Writer facilitated discussion and encouraged peers to support one another. Writer revisited topic the last fifteen minutes and encouraged patients to talk about their experience with the imposter experience. Patients engaged in discussion and were informed the topic will be continued in the next group by writer.      Attendees: 6        Patient's Goal: to process emotions, to challenge distortions, to learn about imposter experience       Notes:  Pt processed struggling with feeling uncomfortable with her medications and feeling frustrated. Pt was receptive to feedback. 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    Status After Intervention:  Improved    Participation Level: Active Listener    Participation Quality: Appropriate      Speech:  normal      Thought Process/Content: Logical      Affective Functioning: Congruent      Mood: anxious      Level of consciousness:  Alert and Oriented x4      Response to Learning: Capable of insight and Progressing to goal      Endings: None Reported    Modes of Intervention: Education and Support      Discipline Responsible: /Counselor      Signature:  DANIEL Mccarty

## 2024-07-16 NOTE — GROUP NOTE
Group Therapy Note    Date: 7/16/2024    Group Start Time:  2:00 PM  Group End Time:  2:45 PM  Group Topic: Wrap-Up    [facility]    Kait Crenshaw MSW        Group Therapy Note  This writer facilitated a wrap-up group by encouraging the patients to identify a main takeaway of the day. Each patient was encouraged to go to the board and write down their main takeaway of the day. Each patient was provided with a bingo board and markers to play the stress bingo game. Each patient was asked to complete an outpatient behavioral health wrap-up sheet and lunch menu.    Attendees: 6       Patient's Goal:  To identify main takeaway    Notes: Pt was present and participated in the PHP group. Pt volunteered to share their response and listened to the support given to them by their peers. Pt identified her main takeaway of the day was learning more about expansion. PT provided active listening for peers as they shared their responses to the question.    Status After Intervention:  Improved    Participation Level: Active Listener and Interactive    Participation Quality: Appropriate, Sharing, and Supportive      Speech:  normal      Thought Process/Content: Logical      Affective Functioning: Congruent      Mood: calm      Level of consciousness:  Alert and Oriented x4      Response to Learning: Able to verbalize current knowledge/experience, Able to verbalize/acknowledge new learning, Able to retain information, Capable of insight, and Progressing to goal      Endings: None Reported    Modes of Intervention: Education, Support, and Socialization      Discipline Responsible: /Counselor      Signature:  DANIEL Miller

## 2024-07-16 NOTE — BH NOTE
Partial Hospitalization Program Individual Psychotherapy Note      Diagnosis:  F41.9: Anxiety, unspecified     Goal: This writer met with the PT to review and collaborate to provide updates to her treatment plan goals. This writer conducted an individual session to inquire about PT progress in groups and provide updates, if needed.            Psychotherapy Session    Start time: 1:30   Stop time: 2:00        Patient Mental Status and Mood/Affect:Anxious, Calm, Congruent, and Happy    Patient Behavior and Appearance: Cooperative and Good eye contactshows no evidence of impairment    Intervention/Techniques: Informed, Validated/Supported, Reflected, Guided, Challenged, Reframed, Modeled/Rehearsed, Prompted/Cued, Listened/Empathized, Observed/Monitored, Queired/Probed, Reinforced, Facilitated Disclosure, and Provided Feedback    Focus of Session/Patient Response and Progress Towards Goal: Pt was engaged in session as evidence of her ability to reflect on her symptoms and identify areas of need.     Narrative: This writer met with the patient to review and update his treatment plan. This writer discussed the current treatment goals and objectives with the patient and added in new progress made towards each goal. This writer began the session with a check-in to gain insight into the patient's current mood and mental status. The pt informed this writer that she is doing good, however, feeling a bit tired due to her new medication. The pt informed this writer that she recently began a new medication and has been trying to adjust to it. The pt informed this writer that she has been feeling super tired since the addition of the new medication and is hopeful that she will adjust to it soon. This writer informed the pt that it takes about 2 weeks for most medications to fully get into the system. The pt informed this writer that she was feeling nervous about taking this new medications. This writer provided verbal validation

## 2024-07-16 NOTE — GROUP NOTE
Group Therapy Note    Date: 7/16/2024    Group Start Time:  1:10 PM  Group End Time:  2:00 PM  Group Topic: Psychoeducation    Jorge Gamboa MSW        Group Therapy Note    Writer facilitated group and started with encouraging patients to continue discussion about their experience with imposter syndrome. Patients were given a packet that included the definition and exercises to help challenge self-doubt and fear underlying the imposter phenomenon. Patients read the content and reflected on the impact it has on their ability to work and connect with people. To conclude, patients were asked to identify one \" would've could've shoulda\" sentence related to topic and reframe it.       Attendees: 4      Patient's Goal: to improve self-compassion, to learn about imposter phenomenon, to improve confidence, to reframe distortions       Notes:  Patient shared having a hard time believing compliments but she tries to believe them. Patient provided feedback to peer but was asked by Kait Crenshaw to come for individual session. Pt did not return the rest of group.    Status After Intervention:  Unchanged    Participation Level: Active Listener    Participation Quality: Appropriate      Speech:  normal      Thought Process/Content: Logical      Affective Functioning: Congruent      Mood: anxious      Level of consciousness:  Alert      Response to Learning: Capable of insight and Progressing to goal      Endings: None Reported    Modes of Intervention: Education and Support      Discipline Responsible: /Counselor      Signature:  DANIEL Mccarty

## 2024-07-16 NOTE — GROUP NOTE
Group Therapy Note    Date: 7/16/2024    Group Start Time: 12:00 PM  Group End Time:  1:00 PM  Group Topic: Psychoeducation    [facility] Kait Love MSW        Group Therapy Note  This writer facilitated a psychotherapy group discussing boundaries. Each patient received a handout with an overview of what boundaries are and the various types of boundaries. After allowing time for this, the facilitator allowed time for reflections based on the activity.     Attendees: 5       Patient's Goal: To provide an overview on boundaries    Notes: Pt was present and participated in group. Pt followed along in group and volunteered to read aloud during group. Pt identified that she has a mix of all three boundaries as it relates to her family.    Status After Intervention:  Improved    Participation Level: Active Listener and Interactive    Participation Quality: Appropriate, Sharing, and Supportive      Speech:  normal      Thought Process/Content: Logical      Affective Functioning: Congruent      Mood: calm      Level of consciousness:  Alert and Oriented x4      Response to Learning: Able to verbalize current knowledge/experience, Able to verbalize/acknowledge new learning, Able to retain information, Capable of insight, and Progressing to goal      Endings: None Reported    Modes of Intervention: Education, Support, and Socialization      Discipline Responsible: /Counselor      Signature:  DANIEL Miller

## 2024-07-17 ENCOUNTER — HOSPITAL ENCOUNTER (OUTPATIENT)
Facility: HOSPITAL | Age: 34
Discharge: HOME | End: 2024-07-20
Payer: COMMERCIAL

## 2024-07-17 VITALS
HEART RATE: 95 BPM | DIASTOLIC BLOOD PRESSURE: 81 MMHG | TEMPERATURE: 98.3 F | SYSTOLIC BLOOD PRESSURE: 115 MMHG | OXYGEN SATURATION: 98 %

## 2024-07-17 PROCEDURE — 90853 GROUP PSYCHOTHERAPY: CPT

## 2024-07-17 NOTE — GROUP NOTE
Group Therapy Note    Date: 7/17/2024    Group Start Time:  1:10 PM  Group End Time:  2:00 PM  Group Topic: Psychotherapy    [facility] Jorge Lr MSW        Group Therapy Note    Writer encouraged patients to complete their paintings. Patients took the majority of the group to complete it. Patients were asked to share how they felt and what they rudy the last fifteen minutes of group.      Attendees: 5            Patient's Goal: to engage in creative expression, to practice coping skill      Notes:  Patient was seen completing her painting and talking about her happy space with peers. Pt shared her painting and said it helped manage her anxiety. Pt also shared not thinking about her medications as much.    Status After Intervention:  Improved    Participation Level: Active Listener    Participation Quality: Appropriate      Speech:  normal      Thought Process/Content: Logical      Affective Functioning: Congruent      Mood: anxious      Level of consciousness:  Alert and Oriented x4      Response to Learning: Capable of insight and Progressing to goal      Endings: None Reported    Modes of Intervention: Support, Socialization, and Exploration      Discipline Responsible: /Counselor      Signature:  DANIEL Mccarty

## 2024-07-17 NOTE — GROUP NOTE
Group Therapy Note    Date: 7/17/2024    Group Start Time:  9:40 AM  Group End Time: 10:30 AM  Group Topic: Process Group - Outpatient    [facility]    Jorge Clifton MSW        Group Therapy Note    Writer facilitated group and started with introducing hot potatoe activity to determine who gets to share their brain dump first. Patients went around and processed emotions as well as provided feedback to one another. There were two patients who were unable to process and encouraged to do so in the next group.        Attendees: 6        Patient's Goal: to process emotions, to engage with peers       Notes:  Patient processed feeling silly for wanting people to comfort her when she feels sick or sad. Pt expressed feeling tired of worrying about her medications. Patient was receptive to feedback and support. Pt also provided supported to others.    Status After Intervention:  Improved    Participation Level: Active Listener    Participation Quality: Appropriate      Speech:  normal      Thought Process/Content: Logical      Affective Functioning: Congruent      Mood: anxious      Level of consciousness:  Alert and Oriented x4      Response to Learning: Capable of insight and Progressing to goal      Endings: None Reported    Modes of Intervention: Support, Socialization, and Exploration      Discipline Responsible: /Counselor      Signature:  DANIEL Mccarty

## 2024-07-17 NOTE — GROUP NOTE
Group Therapy Note    Date: 7/17/2024    Group Start Time: 10:40 AM  Group End Time: 11:30 AM  Group Topic: Cognitive Skills    [facility] Kait Love MSW        Group Therapy Note  This writer facilitated a cognitive skills group focusing on DBT emotional regulation skills. This writer provided each patient with a handout providing insight and education on the skills. Each patient was encouraged to identify what skill they would find most helpful during intense emotions. After allowing time for this, each patient was encouraged to provide reflections based on the activity.      Attendees: 7       Patient's Goal: To provide an overview on DBT emotional regulation skills.     Notes: Pt was present and participated in group. Pt identified that she found the do something unrelated to occupy thoughts the most helpful skill. Pt expressed that she has found distracting herself during intense emotions to be most helpful.     Status After Intervention:  Improved    Participation Level: Active Listener and Interactive    Participation Quality: Appropriate, Sharing, and Supportive      Speech:  normal      Thought Process/Content: Logical      Affective Functioning: Congruent      Mood: calm      Level of consciousness:  Alert and Oriented x4      Response to Learning: Able to verbalize current knowledge/experience, Able to verbalize/acknowledge new learning, Able to retain information, Capable of insight, and Progressing to goal      Endings: None Reported    Modes of Intervention: Support, Socialization, and Exploration      Discipline Responsible: /Counselor      Signature:  DANIEL Miller

## 2024-07-17 NOTE — GROUP NOTE
Group Therapy Note    Date: 7/17/2024    Group Start Time:  9:00 AM  Group End Time:  9:40 AM  Group Topic: Community Meeting    [facility] Jorge Lr MSW        Group Therapy Note    Writer facilitated group and started with requesting patients to complete check in sheets and identify intention for the day. Patients completed check in sheets and wrote intention of the day on the board. Patients were provided with a brain dump worksheet and encouraged to complete. Patients took the remaining of the group to complete it and were encouraged to process it in the next group.        Attendees: 6          Patient's Goal: to assess safety, to practice thought diffusion        Notes:  Patient denied SI/HI/MEHREEN on check in sheet and shared goal of the day was to get through the day. Patient asked if she could take meds before lunch rather than morning. Writer stated Dr. Prieto would be contacted for further guidance. Pt was seen completing worksheet and will continue to work towards goal.    Status After Intervention:  Improved    Participation Level: Active Listener    Participation Quality: Appropriate      Speech:  normal      Thought Process/Content: Logical      Affective Functioning: Congruent      Mood: anxious      Level of consciousness:  Alert and Oriented x4      Response to Learning: Capable of insight and Progressing to goal      Endings: None Reported    Modes of Intervention: Support, Socialization, and Exploration      Discipline Responsible: /Counselor      Signature:  DANIEL Mccarty

## 2024-07-17 NOTE — GROUP NOTE
Group Therapy Note    Date: 7/17/2024    Group Start Time: 12:00 PM  Group End Time:  1:00 PM  Group Topic: Psychotherapy    [facility]    Jorge Clifton MSW        Group Therapy Note    Writer facilitated group and started with a mindfulness meditation. Patients requested to process feeling triggered by the last group and needing advice on how to navigate concerns. Patients engaged in a discussion and provided support to one another. Writer transitioned into activity and asked patients to paint what peace looks like for them. Patients worked on it the entire group and were encouraged to finish and share the next group.        Attendees: 6        Patient's Goal: to engage with peers, to practice coping skills, to practice creative expression       Notes:  Patient processed feeling uncomfortable in the last group and wanting to leave. Patient was receptive to challenging and support. Pt started painting and was agreeable to finish it in the next group.    Status After Intervention:  Improved    Participation Level: Active Listener    Participation Quality: Appropriate      Speech:  normal      Thought Process/Content: Logical      Affective Functioning: Congruent      Mood: anxious      Level of consciousness:  Alert      Response to Learning: Capable of insight and Progressing to goal      Endings: None Reported    Modes of Intervention: Support, Socialization, and Exploration      Discipline Responsible: /Counselor      Signature:  DANIEL Mccarty

## 2024-07-17 NOTE — GROUP NOTE
Group Therapy Note    Date: 7/17/2024    Group Start Time:  2:00 PM  Group End Time:  2:45 PM  Group Topic: Wrap-Up    [facility] Kait Love MSW        Group Therapy Note  This writer formed a wrap-up group focusing on self-care and acts of kindness. This writer provided each patient with an outpatient behavioral wrap-up sheet as well as a lunch menu to complete. This writer provided each patient with a handout with various recommendations for self-care and acts of kindness activities. Each patient was encouraged to identify what skill they would find most helpful during intense emotions. After allowing time for this, each patient was encouraged to provide reflections based on the activity.    Attendees: 6       Patient's Goal: To identify self care and act of kindnesss activities    Notes: Pt was present and participated in group. PT identified that she would like to try de cluttering her space as form of self care over the weekend.     Status After Intervention:  Improved    Participation Level: Active Listener and Interactive    Participation Quality: Appropriate, Sharing, and Supportive      Speech:  normal      Thought Process/Content: Logical      Affective Functioning: Congruent      Mood: calm      Level of consciousness:  Alert and Oriented x4      Response to Learning: Able to verbalize current knowledge/experience, Able to verbalize/acknowledge new learning, Able to retain information, Capable of insight, and Progressing to goal      Endings: None Reported    Modes of Intervention: Education, Support, and Socialization      Discipline Responsible: /Counselor      Signature:  DANIEL Miller

## 2024-07-18 ENCOUNTER — HOSPITAL ENCOUNTER (OUTPATIENT)
Facility: HOSPITAL | Age: 34
Discharge: HOME | End: 2024-07-21
Payer: COMMERCIAL

## 2024-07-18 VITALS
DIASTOLIC BLOOD PRESSURE: 73 MMHG | HEART RATE: 93 BPM | SYSTOLIC BLOOD PRESSURE: 111 MMHG | OXYGEN SATURATION: 93 % | TEMPERATURE: 96.8 F

## 2024-07-18 PROCEDURE — 90853 GROUP PSYCHOTHERAPY: CPT

## 2024-07-18 NOTE — GROUP NOTE
Group Therapy Note    Date: 7/18/2024    Group Start Time:  2:00 PM  Group End Time:  2:45 PM  Group Topic: Wrap-Up    [facility] Jorge Lr MSW        Group Therapy Note    Writer facilitated group and started by requesting patients to complete wrap up sheets along with lunch menus. Patients were encouraged to identify a takeaway and jot it on a note. Patients were encouraged to complete feelings wheel and asked to share it with their peers. To conclude, writer facilitated a round of \"chill, chat, and challenge\" game.       Attendees: 7        Patient's Goal: to assess safety, to identify takeaway, to process emotions       Notes:  Pt denied SI/HI/MEHREEN on wrap up sheet and shared it was hard for her to manage anxiety today. Patient did not complete the wheel but listened to others. Pt will continue to work towards goal.    Status After Intervention:  Unchanged    Participation Level: Active Listener    Participation Quality: Appropriate      Speech:  normal      Thought Process/Content: Logical      Affective Functioning: Congruent      Mood: anxious      Level of consciousness:  Alert      Response to Learning: Progressing to goal      Endings: None Reported    Modes of Intervention: Support, Socialization, and Exploration      Discipline Responsible: /Counselor      Signature:  DANIEL Mccarty

## 2024-07-18 NOTE — GROUP NOTE
Group Therapy Note    Date: 7/18/2024    Group Start Time: 12:00 PM  Group End Time:  1:00 PM  Group Topic: Psychotherapy    [facility]    Kait Crenshaw MSW        Group Therapy Note  This writer facilitated a psychotherapy group focusing on relationship attachment styles. This writer provided each patient with an attachment style assessment to identify their attachment style. Each patient received a handout from the writer, which detailed the characteristics and background of each attachment style. After allowing time for this, the facilitator encourages reflections based on the activity.      Attendees: 6       Patient's Goal: To identify attachment styles    Notes: Pt was present and participated in group. Pt completed attachment style test and found her attachment style. Pt identified that her attachment style is secure attachment. Pt expressed how her attachment style was develop and the impact her mom had on her attachment style.    Status After Intervention:  Improved    Participation Level: Active Listener and Interactive    Participation Quality: Appropriate, Sharing, and Supportive      Speech:  normal      Thought Process/Content: Logical      Affective Functioning: Congruent      Mood: calm      Level of consciousness:  Alert and Oriented x4      Response to Learning: Able to verbalize current knowledge/experience, Able to verbalize/acknowledge new learning, Able to retain information, Capable of insight, and Progressing to goal      Endings: None Reported    Modes of Intervention: Education, Support, and Socialization      Discipline Responsible: /Counselor      Signature:  DANIEL Miller

## 2024-07-18 NOTE — GROUP NOTE
Group Therapy Note    Date: 7/18/2024    Group Start Time:  9:40 AM  Group End Time: 10:30 AM  Group Topic: Process Group - Outpatient    [facility]    Kait Crenshaw MSW        Group Therapy Note  This writer facilitated a process group encouraging patients to share any current stressors or triggers that are currently impacting them. This writer encouraged each patient to process their tangled ball of emotions as well. After allowing time for this, the facilitator encourages reflections based on the activity.    Attendees: 6       Patient's Goal: To process current stressors or triggers    Notes: Pt was present and participated in group. Pt identified that she did not have any stressors or triggers that she would like to process. Pt shared emotions that she colored on her tangled ball of emotions handout. Pt identified emotions that she is feeling currently are indifferent, anxious, sensitive, and restless.    Status After Intervention:  Improved    Participation Level: Active Listener and Interactive    Participation Quality: Appropriate, Sharing, and Supportive      Speech:  normal      Thought Process/Content: Logical      Affective Functioning: Congruent      Mood: calm      Level of consciousness:  Alert and Oriented x4      Response to Learning: Able to verbalize current knowledge/experience, Able to verbalize/acknowledge new learning, Able to retain information, and Capable of insight      Endings: None Reported    Modes of Intervention: Support, Socialization, and Exploration      Discipline Responsible: /Counselor      Signature:  DANIEL Miller

## 2024-07-18 NOTE — GROUP NOTE
Group Therapy Note    Date: 7/18/2024    Group Start Time:  1:10 PM  Group End Time:  2:00 PM  Group Topic: Psychotherapy    [facility] Jorge Lr MSW        Group Therapy Note    Writer facilitated group and started with a deep breathing meditation. Patients were encouraged to engage in a discussion about the importance of identifying emotions. Writer facilitated emotion charades and encouraged peers to support one another. To conclude, patients were asked to start their feelings wheel and continue working on it in the next group.        Attendees: 6        Patient's Goal: to identify emotions, to process emotions      Notes:  Patient appeared tearful and expressed feeling overwhelmed at the start. Pt was able to regulate to play the game but was seen crying afterwards. Pt was receptive to support and feedback from writer. Pt was not able to start feelings wheel.    Status After Intervention:  Unchanged    Participation Level: Active Listener    Participation Quality: Appropriate and Lethargic      Speech:  normal      Thought Process/Content: Logical      Affective Functioning: Congruent      Mood: anxious      Level of consciousness:  Alert      Response to Learning: Progressing to goal      Endings: None Reported    Modes of Intervention: Education, Support, and Exploration      Discipline Responsible: /Counselor      Signature:  DANIEL Mccarty

## 2024-07-18 NOTE — GROUP NOTE
Group Therapy Note    Date: 7/18/2024    Group Start Time: 10:40 AM  Group End Time: 11:30 AM  Group Topic: Cognitive Skills    [facility] Jorge Lr MSW        Group Therapy Note    Writer facilitated group and started with an emotion check in. Writer projected a clip from inside out that demonstrated the concept of two emotions being valid at the same time. Patients engaged in a discussion about their emotions and how they can relate to the clip.      Attendees: 6        Patient's Goal: to identify emotions, to process emotions      Notes:  Patient actively watched the clip and processed her relationship with fear and anxiety. Pt shared believing that they were the same the whole time but is now recognizing their ability to work together to protect.     Status After Intervention:  Improved    Participation Level: Active Listener    Participation Quality: Appropriate      Speech:  normal      Thought Process/Content: Logical      Affective Functioning: Congruent      Mood: anxious      Level of consciousness:  Alert and Oriented x4      Response to Learning: Capable of insight and Progressing to goal      Endings: None Reported    Modes of Intervention: Education, Support, Socialization, and Exploration      Discipline Responsible: /Counselor      Signature:  DANIEL Mccarty

## 2024-07-18 NOTE — GROUP NOTE
Group Therapy Note    Date: 7/18/2024    Group Start Time:  9:00 AM  Group End Time:  9:40 AM  Group Topic: Community Meeting    [facility] Kait Love MSW        Group Therapy Note  This writer facilitated a community group focusing on identifying emotions. This writer provided each patient with an outpatient behavioral check-in sheet to fill out. This writer provided each patient with a \"Tangled Ball of Emotions\" handout to fill out during the group.       Attendees: 6       Patient's Goal: To identify current emotions    Notes: Pt was present and participated in group. Pt filled out her check in sheet and menu. Pt began working on her tangled ball of emotions handout. Pt provided insight that she is still adjusting to her new medications and side effects.     Status After Intervention:  Improved    Participation Level: Active Listener and Interactive    Participation Quality: Appropriate, Sharing, and Supportive      Speech:  normal      Thought Process/Content: Logical      Affective Functioning: Congruent      Mood: calm      Level of consciousness:  Alert and Oriented x4      Response to Learning: Able to verbalize current knowledge/experience, Able to verbalize/acknowledge new learning, Able to retain information, and Capable of insight      Endings: None Reported    Modes of Intervention: Support, Socialization, and Exploration      Discipline Responsible: /Counselor      Signature:  DANIEL Miller

## 2024-07-19 ENCOUNTER — HOSPITAL ENCOUNTER (OUTPATIENT)
Facility: HOSPITAL | Age: 34
Discharge: HOME | End: 2024-07-22
Payer: COMMERCIAL

## 2024-07-19 VITALS
OXYGEN SATURATION: 99 % | HEART RATE: 78 BPM | SYSTOLIC BLOOD PRESSURE: 112 MMHG | DIASTOLIC BLOOD PRESSURE: 77 MMHG | TEMPERATURE: 98.2 F

## 2024-07-19 PROCEDURE — 90853 GROUP PSYCHOTHERAPY: CPT

## 2024-07-19 NOTE — GROUP NOTE
Group Therapy Note    Date: 7/19/2024    Group Start Time:  2:00 PM  Group End Time:  2:45 PM  Group Topic: Wrap-Up    [facility] Kait Love MSW        Group Therapy Note  This writer facilitated a wrap-up group by encouraging the patients to identify a main take away of the day. Each patient was asked to complete an outpatient behavioral health wrap-up sheet. Each patient was provided with a copy of their safety plan. After allowing time for this, this facilitator encouraged patients to discuss their thoughts, feelings, and reflections following the activity.       Attendees: 6       Patient's Goal: To identify a main takeaway     Notes: Pt was present and participated in group. Pt identified that her main takeaway from the day is receiving support from her peers while trying to manage her anxiety.     Status After Intervention:  Improved    Participation Level: Active Listener and Interactive    Participation Quality: Appropriate, Sharing, and Supportive      Speech:  normal      Thought Process/Content: Logical      Affective Functioning: Congruent      Mood: calm      Level of consciousness:  Alert and Oriented x4      Response to Learning: Able to verbalize current knowledge/experience, Able to verbalize/acknowledge new learning, Able to retain information, and Capable of insight      Endings: None Reported    Modes of Intervention: Support, Socialization, and Exploration      Discipline Responsible: /Counselor      Signature:  DANIEL Miller

## 2024-07-19 NOTE — GROUP NOTE
Group Therapy Note    Date: 7/19/2024    Group Start Time:  1:10 PM  Group End Time:  2:00 PM  Group Topic: Psychotherapy    [facility]    Jorge Clifton MSW        Group Therapy Note      Writer facilitated group and started with requesting patients to complete letters to future PHP peers. Patients completed and shared the content. Patients were provided with information on isolation and asked to come up with a wellness to do list.     Attendees: 6        Patient's Goal: to learn about isolation, to manage isolative urges, to identify coping skills, to reflect on takeaways       Notes:  Pt appeared anxious and tearful. Pt was seen completing the letter but unable to share it. Pt was speaking with Dr. Prieto the last fifteen minutes of group and did not verbally engage with peers.    Status After Intervention:  Decompensated    Participation Level: Minimal    Participation Quality: Lethargic      Speech:  normal      Thought Process/Content: Logical      Affective Functioning: Flat      Mood: anxious      Level of consciousness:  Preoccupied      Response to Learning: Resistant      Endings: None Reported    Modes of Intervention: Education and Support      Discipline Responsible: /Counselor      Signature:  DANIEL Mccarty

## 2024-07-19 NOTE — BH NOTE
Patient requested extra support during wrap up group to help with panic attack. Pt was seen sitting on the ground in the group and writer guided her to the room. Writer facilitated deep breathing and allowed pt to process how she was feeling. Pt expressed feeling crazy and scared of having a panic attack similar to ones in the past. Writer validated and provided brief information about somatic flashbacks. Pt appeared tearful but body was seen to be less shakey. Writer facilitated containment meditation. Pt was receptive and appear to regulate. Pt provided with safety plan on top of an extra list of grounding exercises.

## 2024-07-19 NOTE — GROUP NOTE
Group Therapy Note    Date: 7/19/2024    Group Start Time: 12:00 PM  Group End Time:  1:00 PM  Group Topic: Psychotherapy    [facility]    Kait Crenshaw MSW        Group Therapy Note  This writer facilitated a psychotherapy group focusing on expressing emotions. This writer encouraged patients to paint or draw how they are feeling in the current moment. After allowing time for this, the facilitator encourages reflections based on the activity.    Attendees: 6       Patient's Goal: To express emotions    Notes: Pt was present and participated in group. Pt began working on her drawing. Pt was observed interacting with her peers and receiving support from them.     Status After Intervention:  Improved    Participation Level: Active Listener and Interactive    Participation Quality: Appropriate, Sharing, and Supportive      Speech:  normal      Thought Process/Content: Logical      Affective Functioning: Congruent      Mood: calm      Level of consciousness:  Alert and Oriented x4      Response to Learning: Able to verbalize current knowledge/experience, Able to verbalize/acknowledge new learning, Able to retain information, and Capable of insight      Endings: None Reported    Modes of Intervention: Support, Socialization, and Exploration      Discipline Responsible: /Counselor      Signature:  DANIEL Miller

## 2024-07-20 ENCOUNTER — HOSPITAL ENCOUNTER (EMERGENCY)
Facility: HOSPITAL | Age: 34
Discharge: HOME | End: 2024-07-20
Attending: STUDENT IN AN ORGANIZED HEALTH CARE EDUCATION/TRAINING PROGRAM
Payer: COMMERCIAL

## 2024-07-20 VITALS
DIASTOLIC BLOOD PRESSURE: 100 MMHG | OXYGEN SATURATION: 99 % | HEART RATE: 103 BPM | BODY MASS INDEX: 24.61 KG/M2 | WEIGHT: 147.71 LBS | HEIGHT: 65 IN | RESPIRATION RATE: 18 BRPM | TEMPERATURE: 98 F | SYSTOLIC BLOOD PRESSURE: 133 MMHG

## 2024-07-20 DIAGNOSIS — F41.1 ANXIETY STATE: Primary | ICD-10-CM

## 2024-07-20 LAB
ALBUMIN SERPL-MCNC: 4.4 G/DL (ref 3.5–5)
ALBUMIN/GLOB SERPL: 1.4 (ref 1.1–2.2)
ALP SERPL-CCNC: 50 U/L (ref 45–117)
ALT SERPL-CCNC: 46 U/L (ref 12–78)
ANION GAP SERPL CALC-SCNC: 7 MMOL/L (ref 5–15)
APAP SERPL-MCNC: <2 UG/ML (ref 10–30)
AST SERPL-CCNC: 17 U/L (ref 15–37)
BASOPHILS # BLD: 0 K/UL (ref 0–0.1)
BASOPHILS NFR BLD: 0 % (ref 0–1)
BILIRUB SERPL-MCNC: 0.7 MG/DL (ref 0.2–1)
BUN SERPL-MCNC: 6 MG/DL (ref 6–20)
BUN/CREAT SERPL: 7 (ref 12–20)
CALCIUM SERPL-MCNC: 9.9 MG/DL (ref 8.5–10.1)
CHLORIDE SERPL-SCNC: 105 MMOL/L (ref 97–108)
CO2 SERPL-SCNC: 26 MMOL/L (ref 21–32)
COMMENT:: NORMAL
COMMENT:: NORMAL
CREAT SERPL-MCNC: 0.86 MG/DL (ref 0.55–1.02)
DIFFERENTIAL METHOD BLD: NORMAL
EOSINOPHIL # BLD: 0.1 K/UL (ref 0–0.4)
EOSINOPHIL NFR BLD: 1 % (ref 0–7)
ERYTHROCYTE [DISTWIDTH] IN BLOOD BY AUTOMATED COUNT: 11.9 % (ref 11.5–14.5)
ETHANOL SERPL-MCNC: <10 MG/DL (ref 0–0.08)
GLOBULIN SER CALC-MCNC: 3.2 G/DL (ref 2–4)
GLUCOSE SERPL-MCNC: 106 MG/DL (ref 65–100)
HCT VFR BLD AUTO: 36.1 % (ref 35–47)
HGB BLD-MCNC: 12.8 G/DL (ref 11.5–16)
IMM GRANULOCYTES # BLD AUTO: 0 K/UL (ref 0–0.04)
IMM GRANULOCYTES NFR BLD AUTO: 0 % (ref 0–0.5)
LYMPHOCYTES # BLD: 2.2 K/UL (ref 0.8–3.5)
LYMPHOCYTES NFR BLD: 40 % (ref 12–49)
MCH RBC QN AUTO: 30.8 PG (ref 26–34)
MCHC RBC AUTO-ENTMCNC: 35.5 G/DL (ref 30–36.5)
MCV RBC AUTO: 87 FL (ref 80–99)
MONOCYTES # BLD: 0.5 K/UL (ref 0–1)
MONOCYTES NFR BLD: 8 % (ref 5–13)
NEUTS SEG # BLD: 2.8 K/UL (ref 1.8–8)
NEUTS SEG NFR BLD: 51 % (ref 32–75)
NRBC # BLD: 0 K/UL (ref 0–0.01)
NRBC BLD-RTO: 0 PER 100 WBC
PLATELET # BLD AUTO: 295 K/UL (ref 150–400)
PMV BLD AUTO: 10.9 FL (ref 8.9–12.9)
POTASSIUM SERPL-SCNC: 3.7 MMOL/L (ref 3.5–5.1)
PROT SERPL-MCNC: 7.6 G/DL (ref 6.4–8.2)
RBC # BLD AUTO: 4.15 M/UL (ref 3.8–5.2)
SALICYLATES SERPL-MCNC: <1.7 MG/DL (ref 2.8–20)
SODIUM SERPL-SCNC: 138 MMOL/L (ref 136–145)
SPECIMEN HOLD: NORMAL
WBC # BLD AUTO: 5.6 K/UL (ref 3.6–11)

## 2024-07-20 PROCEDURE — 36415 COLL VENOUS BLD VENIPUNCTURE: CPT

## 2024-07-20 PROCEDURE — 80143 DRUG ASSAY ACETAMINOPHEN: CPT

## 2024-07-20 PROCEDURE — 80179 DRUG ASSAY SALICYLATE: CPT

## 2024-07-20 PROCEDURE — 99284 EMERGENCY DEPT VISIT MOD MDM: CPT

## 2024-07-20 PROCEDURE — 85025 COMPLETE CBC W/AUTO DIFF WBC: CPT

## 2024-07-20 PROCEDURE — 93005 ELECTROCARDIOGRAM TRACING: CPT | Performed by: STUDENT IN AN ORGANIZED HEALTH CARE EDUCATION/TRAINING PROGRAM

## 2024-07-20 PROCEDURE — 80053 COMPREHEN METABOLIC PANEL: CPT

## 2024-07-20 PROCEDURE — 82077 ASSAY SPEC XCP UR&BREATH IA: CPT

## 2024-07-20 ASSESSMENT — PAIN - FUNCTIONAL ASSESSMENT: PAIN_FUNCTIONAL_ASSESSMENT: PREVENTS OR INTERFERES SOME ACTIVE ACTIVITIES AND ADLS

## 2024-07-20 ASSESSMENT — PAIN DESCRIPTION - ONSET: ONSET: ON-GOING

## 2024-07-20 ASSESSMENT — PAIN SCALES - GENERAL: PAINLEVEL_OUTOF10: 3

## 2024-07-20 ASSESSMENT — PAIN DESCRIPTION - ORIENTATION: ORIENTATION: LEFT

## 2024-07-20 ASSESSMENT — PAIN DESCRIPTION - LOCATION: LOCATION: ARM

## 2024-07-20 ASSESSMENT — PAIN DESCRIPTION - PAIN TYPE: TYPE: ACUTE PAIN

## 2024-07-20 ASSESSMENT — PAIN DESCRIPTION - DESCRIPTORS: DESCRIPTORS: TINGLING

## 2024-07-20 ASSESSMENT — PAIN DESCRIPTION - FREQUENCY: FREQUENCY: INTERMITTENT

## 2024-07-20 NOTE — ED TRIAGE NOTES
Pt is in partial hospitalization program for mental health. Started on lexapro this week and started having dizziness, tingling, shortness of breath. Tingling down left arm at this time since yesterday midday. Pt states she is on klonopin and \"it is not working\". Pt anxious and tearful in triage. Admits anxiety has worsened over last month    Pt was told by Dr. Hernández, psych to come in for eval.    Denies SI/HI

## 2024-07-21 LAB
EKG ATRIAL RATE: 85 BPM
EKG DIAGNOSIS: NORMAL
EKG P AXIS: 71 DEGREES
EKG P-R INTERVAL: 166 MS
EKG Q-T INTERVAL: 346 MS
EKG QRS DURATION: 74 MS
EKG QTC CALCULATION (BAZETT): 411 MS
EKG R AXIS: 54 DEGREES
EKG T AXIS: 57 DEGREES
EKG VENTRICULAR RATE: 85 BPM

## 2024-07-21 NOTE — BSMART NOTE
BSMART assessment completed, and suicide risk level noted to be no risk.Charge Nurse Cayden and Physician Dr. Rankin notified. Concerns not observed.        
[facility]smart is aware of consult and will assess patient once two previous consults have been completed.  
being described as N/A not explored .  The patient is currently unemployed and speaks English as a primary language.  The patient has no communication impairments affecting communication. The patient's preference for learning can be described as: can read and write adequately.  The patient's hearing is normal.  The patient's vision is impaired and  wears glasses or contacts.      Lui Benítez LCSW

## 2024-07-21 NOTE — ED PROVIDER NOTES
mg    SUMATRIPTAN (IMITREX) 50 MG TABLET    TAKE 1 TABLET BY MOUTH AT ONSET OF MIGRAINE, MAY REPEAT X 1 IN 2 HRS IF NEEDED. MAXIUMUM  MG IN 24 HRS       ALLERGIES     Shellfish-derived products    FAMILY HISTORY       Family History   Problem Relation Age of Onset    Other Mother         Hep C    Lung Cancer Father           SOCIAL HISTORY       Social History     Socioeconomic History    Marital status: Single   Tobacco Use    Smoking status: Never    Smokeless tobacco: Never   Substance and Sexual Activity    Alcohol use: Not Currently           PHYSICAL EXAM    (up to 7 for level 4, 8 or more for level 5)     ED Triage Vitals [07/20/24 1745]   BP Temp Temp Source Pulse Respirations SpO2 Height Weight - Scale   (!) 133/100 98 °F (36.7 °C) Temporal (!) 103 18 99 % 1.651 m (5' 5\") 67 kg (147 lb 11.3 oz)       Body mass index is 24.58 kg/m².    Physical Exam  Vitals and nursing note reviewed.   Constitutional:       Appearance: Normal appearance.   HENT:      Head: Normocephalic and atraumatic.      Nose: Nose normal.   Eyes:      Extraocular Movements: Extraocular movements intact.      Pupils: Pupils are equal, round, and reactive to light.   Cardiovascular:      Rate and Rhythm: Normal rate and regular rhythm.      Pulses: Normal pulses.      Heart sounds: Normal heart sounds.   Pulmonary:      Effort: Pulmonary effort is normal.      Breath sounds: Normal breath sounds.   Abdominal:      General: Bowel sounds are normal.      Palpations: Abdomen is soft.   Musculoskeletal:         General: Normal range of motion.      Cervical back: Normal range of motion and neck supple.   Skin:     General: Skin is warm and dry.   Neurological:      General: No focal deficit present.      Mental Status: She is alert and oriented to person, place, and time. Mental status is at baseline.      GCS: GCS eye subscore is 4. GCS verbal subscore is 5. GCS motor subscore is 6.      Motor: Tremor present.   Psychiatric:

## 2024-07-23 ENCOUNTER — HOSPITAL ENCOUNTER (OUTPATIENT)
Facility: HOSPITAL | Age: 34
Discharge: HOME | End: 2024-07-26
Payer: COMMERCIAL

## 2024-07-23 NOTE — BH NOTE
DISCHARGE PROGRESS NOTE    The patient was discharged AMA on 7/23/24 due to feeling like PHP treatment is not the best fit to meet her treatment needs. The pt reached out via telephone on 7/23/24 to inform [facility] staff of her decision to discharge. The patient was challenged and encouraged to come in to discuss her feelings and attempt to find a solution that would help manage her mental health symptoms; however, the patient informed [facility] staff that her decision was final. Throughout her time in PHP, the PT has been able to make progress on both of her goals. The PT goals are as follows: 1. The client will learn and implement calming skills to reduce overall anxiety and manage anxiety symptoms. and 2. The client will learn and implement problem-solving strategies for realistically addressing worries. In regards to her first goal, the PT informed this writer that she has been trying various coping skills to manage anxiety. Pt endorses that she has been going through a trial-and-error phase to find the most appropriate coping skills. In regards to her second goal, the PT informed this writer that she has been recognizing triggers and using coping skills in the moment to mitigate intense feelings of anxiety. Throughout the course of treatment, the patient began taking Lexapro (1.5 mg) to help manage symptoms of anxiety and depression. Dr. Prieto informed this patient to discontinue taking the lexapro on 7/19/24 following the patient's concerns about the increase in anxiety symptoms.

## 2024-07-23 NOTE — BH NOTE
PARTIAL HOSPITALIZATION PROGRAM DISCHARGE SUMMARY    Janiya Geronimo  34 y.o.  1990  [id number]  [phone]        Admission Date: 06/25/24  Discharge Date: 08/02/24    Admission Diagnosis (DSM 5): F41.9: Anxiety, unspecified     Discharge Diagnosis (DSM 5): F41.9: Anxiety, unspecified     Status at Last Contact: t was stable, upbeat, and receptive during telephone conversation.    Date and Time of Last Contact/Attempted Contact: Telephone Call on 7/23/24 at 8:20 am      Guardian Contacted: N/A    Reason for Admission (Summary): This patient was referred to [facility] treatment through her outpatient psychiatrist due to worsening anxiety symptoms. PT expressed that she has experienced high levels of anxiety, panic attacks, stress, and worry following a wisdom tooth removal procedure. The patient expressed that there were some medical complications that occurred during the surgery and has not been feeling the same since then. Pt identified that she began experiencing an increased level of anxiety about a week after the surgery, stating that she felt an \"elevated level of stress\" as she began noticing a change in her moods and presentation. Pt indicated that she feels angry towards the staff that completed her exam and the lack of care afterwards. PT indicated that, due to this traumatic event, she has changed in various ways. PT informed this writer that she has not been able to work, drive, or engage in leisure activities due to increased levels of anxiety and stress. Pt informed this writer that she has not been able to interact within her social groups ([employer], theater, etc.) or attend social events (birthday parties, baby showers, etc.). The patient informed this writer that she has not been able to go to restaurants due to extreme levels of anxiety and stress. Pt informed this writer that due to this change, she has lost her sense of community and has been struggling with finding people in the community, agencies,

## 2024-07-24 ENCOUNTER — APPOINTMENT (OUTPATIENT)
Facility: HOSPITAL | Age: 34
End: 2024-07-24
Payer: COMMERCIAL

## 2024-07-25 ENCOUNTER — APPOINTMENT (OUTPATIENT)
Facility: HOSPITAL | Age: 34
End: 2024-07-25
Payer: COMMERCIAL

## 2024-07-26 ENCOUNTER — APPOINTMENT (OUTPATIENT)
Facility: HOSPITAL | Age: 34
End: 2024-07-26
Payer: COMMERCIAL

## 2024-07-29 ENCOUNTER — APPOINTMENT (OUTPATIENT)
Facility: HOSPITAL | Age: 34
End: 2024-07-29
Payer: COMMERCIAL

## 2024-07-30 ENCOUNTER — APPOINTMENT (OUTPATIENT)
Facility: HOSPITAL | Age: 34
End: 2024-07-30
Payer: COMMERCIAL

## 2024-07-31 ENCOUNTER — APPOINTMENT (OUTPATIENT)
Facility: HOSPITAL | Age: 34
End: 2024-07-31
Payer: COMMERCIAL

## 2025-04-25 ENCOUNTER — OFFICE VISIT (OUTPATIENT)
Age: 35
End: 2025-04-25
Payer: COMMERCIAL

## 2025-04-25 VITALS
TEMPERATURE: 98.2 F | HEIGHT: 65 IN | DIASTOLIC BLOOD PRESSURE: 56 MMHG | WEIGHT: 168.3 LBS | OXYGEN SATURATION: 100 % | BODY MASS INDEX: 28.04 KG/M2 | SYSTOLIC BLOOD PRESSURE: 133 MMHG | HEART RATE: 76 BPM

## 2025-04-25 DIAGNOSIS — E06.3 HASHIMOTO'S DISEASE: Primary | ICD-10-CM

## 2025-04-25 PROCEDURE — 99203 OFFICE O/P NEW LOW 30 MIN: CPT | Performed by: INTERNAL MEDICINE

## 2025-04-25 RX ORDER — CLONAZEPAM 1 MG/1
1 TABLET ORAL 3 TIMES DAILY
COMMUNITY

## 2025-04-25 RX ORDER — RIMEGEPANT SULFATE 75 MG/75MG
TABLET, ORALLY DISINTEGRATING ORAL
COMMUNITY
Start: 2025-04-19

## 2025-04-25 RX ORDER — ESCITALOPRAM OXALATE 20 MG
20 TABLET ORAL DAILY
COMMUNITY

## 2025-04-25 NOTE — PROGRESS NOTES
CC:   Chief Complaint   Patient presents with    New Patient    Thyroid Problem     PCP:Luis Vega DO  Referring provider: No referring provider defined for this encounter.    History of Present Illness  The patient presents for evaluation of Hashimoto's disease.    Diagnosed with Hashimoto's disease in 2020, seen me then once.  Recent blood tests showed slightly elevated thyroid levels, then normal.   No family history of thyroid disorders or autoimmune diseases. Suspects her mother may have had thyroid issues.     No illness reported during October 2024 blood test. Healthy diet, weight gain attributed to Lexapro. Weekly exercise, efforts to return to comfortable weight. No lumps, excessive tiredness, swelling, or choking sensations. Regular menstrual cycles, not using birth control pills, no children, no plans for children. Avoids processed food, prefers whole food. No coffee, uses almond milk. Takes B12 supplements and vitamin D weekly.    Found to have a cardiac murmur by Dr. Saleh. Ultrasound revealed thyroid cysts.      Takes Nurtec for menstrual migraines, no longer uses Imitrex.    SOCIAL HISTORY  - Does not smoke    FAMILY HISTORY  - No family history of thyroid disease or thyroid cancer  - Suspects mother may have thyroid issues  - No family history of autoimmune conditions    Current Outpatient Medications   Medication Sig Dispense Refill    clonazePAM (KLONOPIN) 1 MG tablet Take 1 tablet by mouth in the morning, at noon, and at bedtime.      LEXAPRO 20 MG tablet Take 1 tablet by mouth daily      NURTEC 75 MG TBDP 1 TABLET ON THE TONGUE AND ALLOW TO DISSOLVE ORALLY AS NEEDED FOR ONSET OF MIGRAINE SYMPTOMS 30 DAYS      VITAMIN D PO Take by mouth      Cyanocobalamin (B-12 PO) Take by mouth      acyclovir (ZOVIRAX) 400 MG tablet Take 1 tablet by mouth as needed      ALPRAZolam (XANAX) 1 MG tablet Take 1 tablet by mouth 3 times daily. Max Daily Amount: 3 mg      SUMAtriptan (IMITREX) 50 MG tablet

## 2025-04-25 NOTE — PROGRESS NOTES
Janiya Geronimo is a 34 y.o. female here for   Chief Complaint   Patient presents with    New Patient    Thyroid Problem       1. Have you been to the ER, urgent care clinic since your last visit?  Hospitalized since your last visit? - n/a    2. Have you seen or consulted any other health care providers outside of the [facility] since your last visit?  Include any pap smears or colon screening.- n/a